# Patient Record
Sex: FEMALE | Race: WHITE | NOT HISPANIC OR LATINO | Employment: FULL TIME | URBAN - METROPOLITAN AREA
[De-identification: names, ages, dates, MRNs, and addresses within clinical notes are randomized per-mention and may not be internally consistent; named-entity substitution may affect disease eponyms.]

---

## 2017-09-05 ENCOUNTER — TELEPHONE (OUTPATIENT)
Dept: URGENT CARE | Facility: CLINIC | Age: 47
End: 2017-09-05

## 2017-09-07 ENCOUNTER — EH NON-PROVIDER (OUTPATIENT)
Dept: OCCUPATIONAL MEDICINE | Facility: CLINIC | Age: 47
End: 2017-09-07

## 2017-09-07 DIAGNOSIS — Z29.89 NEED FOR ISOLATION: ICD-10-CM

## 2017-09-07 PROCEDURE — 94375 RESPIRATORY FLOW VOLUME LOOP: CPT

## 2017-09-18 ENCOUNTER — HOSPITAL ENCOUNTER (OUTPATIENT)
Dept: LAB | Facility: MEDICAL CENTER | Age: 47
End: 2017-09-18
Payer: COMMERCIAL

## 2017-09-18 LAB
BDY FAT % MEASURED: 36.2 %
BP DIAS: 73 MMHG
BP SYS: 117 MMHG
CHOLEST SERPL-MCNC: 153 MG/DL (ref 100–199)
DIABETES HTDIA: NO
EVENT NAME HTEVT: NORMAL
FASTING HTFAS: YES
GLUCOSE SERPL-MCNC: 93 MG/DL (ref 65–99)
HDLC SERPL-MCNC: 37 MG/DL
HYPERTENSION HTHYP: NO
LDLC SERPL CALC-MCNC: 100 MG/DL
SCREENING LOC CITY HTCIT: NORMAL
SCREENING LOC STATE HTSTA: NORMAL
SCREENING LOCATION HTLOC: NORMAL
SMOKING HTSMO: NO
SUBSCRIBER ID HTSID: NORMAL
TRIGL SERPL-MCNC: 80 MG/DL (ref 0–149)

## 2017-09-18 PROCEDURE — 36415 COLL VENOUS BLD VENIPUNCTURE: CPT

## 2017-09-18 PROCEDURE — S5190 WELLNESS ASSESSMENT BY NONPH: HCPCS

## 2017-09-18 PROCEDURE — 80061 LIPID PANEL: CPT

## 2017-09-18 PROCEDURE — 82947 ASSAY GLUCOSE BLOOD QUANT: CPT

## 2017-10-20 ENCOUNTER — HOSPITAL ENCOUNTER (OUTPATIENT)
Dept: RADIOLOGY | Facility: MEDICAL CENTER | Age: 47
End: 2017-10-20
Attending: OBSTETRICS & GYNECOLOGY
Payer: COMMERCIAL

## 2017-10-20 DIAGNOSIS — Z12.31 VISIT FOR SCREENING MAMMOGRAM: ICD-10-CM

## 2017-10-20 PROCEDURE — G0202 SCR MAMMO BI INCL CAD: HCPCS

## 2017-11-08 ENCOUNTER — IMMUNIZATION (OUTPATIENT)
Dept: OCCUPATIONAL MEDICINE | Facility: CLINIC | Age: 47
End: 2017-11-08

## 2017-11-08 DIAGNOSIS — Z23 NEED FOR VACCINATION: ICD-10-CM

## 2017-11-08 PROCEDURE — 90686 IIV4 VACC NO PRSV 0.5 ML IM: CPT | Performed by: PREVENTIVE MEDICINE

## 2018-08-27 ENCOUNTER — DOCUMENTATION (OUTPATIENT)
Dept: OCCUPATIONAL MEDICINE | Facility: CLINIC | Age: 48
End: 2018-08-27

## 2018-09-13 ENCOUNTER — HOSPITAL ENCOUNTER (OUTPATIENT)
Dept: LAB | Facility: MEDICAL CENTER | Age: 48
End: 2018-09-13
Payer: COMMERCIAL

## 2018-09-13 LAB
BDY FAT % MEASURED: 36.7 %
BP DIAS: 64 MMHG
BP SYS: 115 MMHG
CHOLEST SERPL-MCNC: 153 MG/DL (ref 100–199)
DIABETES HTDIA: NO
EVENT NAME HTEVT: NORMAL
FASTING HTFAS: YES
GLUCOSE SERPL-MCNC: 90 MG/DL (ref 65–99)
HDLC SERPL-MCNC: 36 MG/DL
HYPERTENSION HTHYP: NO
LDLC SERPL CALC-MCNC: 97 MG/DL
SCREENING LOC CITY HTCIT: NORMAL
SCREENING LOC STATE HTSTA: NORMAL
SCREENING LOCATION HTLOC: NORMAL
SMOKING HTSMO: NO
SUBSCRIBER ID HTSID: NORMAL
TRIGL SERPL-MCNC: 102 MG/DL (ref 0–149)

## 2018-09-13 PROCEDURE — 82947 ASSAY GLUCOSE BLOOD QUANT: CPT

## 2018-09-13 PROCEDURE — S5190 WELLNESS ASSESSMENT BY NONPH: HCPCS

## 2018-09-13 PROCEDURE — 36415 COLL VENOUS BLD VENIPUNCTURE: CPT

## 2018-09-13 PROCEDURE — 80061 LIPID PANEL: CPT

## 2018-09-14 ENCOUNTER — EH NON-PROVIDER (OUTPATIENT)
Dept: OCCUPATIONAL MEDICINE | Facility: CLINIC | Age: 48
End: 2018-09-14

## 2018-09-14 DIAGNOSIS — Z02.89 ENCOUNTER FOR OCCUPATIONAL HEALTH EXAMINATION INVOLVING RESPIRATOR: Primary | ICD-10-CM

## 2018-09-14 PROCEDURE — 94375 RESPIRATORY FLOW VOLUME LOOP: CPT | Performed by: PREVENTIVE MEDICINE

## 2018-09-26 ENCOUNTER — IMMUNIZATION (OUTPATIENT)
Dept: OCCUPATIONAL MEDICINE | Facility: CLINIC | Age: 48
End: 2018-09-26

## 2018-09-26 DIAGNOSIS — Z23 NEED FOR VACCINATION: ICD-10-CM

## 2018-09-26 PROCEDURE — 90686 IIV4 VACC NO PRSV 0.5 ML IM: CPT | Performed by: PREVENTIVE MEDICINE

## 2018-10-24 ENCOUNTER — HOSPITAL ENCOUNTER (OUTPATIENT)
Dept: RADIOLOGY | Facility: MEDICAL CENTER | Age: 48
End: 2018-10-24
Attending: OBSTETRICS & GYNECOLOGY
Payer: COMMERCIAL

## 2018-10-24 DIAGNOSIS — Z12.31 VISIT FOR SCREENING MAMMOGRAM: ICD-10-CM

## 2018-10-24 PROCEDURE — 77067 SCR MAMMO BI INCL CAD: CPT

## 2019-09-18 ENCOUNTER — HOSPITAL ENCOUNTER (OUTPATIENT)
Dept: LAB | Facility: MEDICAL CENTER | Age: 49
End: 2019-09-18
Payer: COMMERCIAL

## 2019-09-18 LAB
BDY FAT % MEASURED: 37 %
BP DIAS: 77 MMHG
BP SYS: 119 MMHG
CHOLEST SERPL-MCNC: 157 MG/DL (ref 100–199)
DIABETES HTDIA: NO
EVENT NAME HTEVT: NORMAL
FASTING HTFAS: YES
GLUCOSE SERPL-MCNC: 92 MG/DL (ref 65–99)
HDLC SERPL-MCNC: 37 MG/DL
HYPERTENSION HTHYP: NO
LDLC SERPL CALC-MCNC: 103 MG/DL
SCREENING LOC CITY HTCIT: NORMAL
SCREENING LOC STATE HTSTA: NORMAL
SCREENING LOCATION HTLOC: NORMAL
SMOKING HTSMO: NO
SUBSCRIBER ID HTSID: NORMAL
TRIGL SERPL-MCNC: 87 MG/DL (ref 0–149)

## 2019-09-18 PROCEDURE — S5190 WELLNESS ASSESSMENT BY NONPH: HCPCS

## 2019-09-18 PROCEDURE — 82947 ASSAY GLUCOSE BLOOD QUANT: CPT

## 2019-09-18 PROCEDURE — 36415 COLL VENOUS BLD VENIPUNCTURE: CPT

## 2019-09-18 PROCEDURE — 80061 LIPID PANEL: CPT

## 2019-09-24 ENCOUNTER — IMMUNIZATION (OUTPATIENT)
Dept: OCCUPATIONAL MEDICINE | Facility: CLINIC | Age: 49
End: 2019-09-24

## 2019-09-24 DIAGNOSIS — Z23 NEED FOR VACCINATION: ICD-10-CM

## 2019-09-24 DIAGNOSIS — Z23 IMMUNIZATION DUE: ICD-10-CM

## 2019-09-24 PROCEDURE — 90686 IIV4 VACC NO PRSV 0.5 ML IM: CPT | Performed by: PREVENTIVE MEDICINE

## 2019-09-26 ENCOUNTER — DOCUMENTATION (OUTPATIENT)
Dept: OCCUPATIONAL MEDICINE | Facility: CLINIC | Age: 49
End: 2019-09-26

## 2019-10-01 ENCOUNTER — EH NON-PROVIDER (OUTPATIENT)
Dept: OCCUPATIONAL MEDICINE | Facility: CLINIC | Age: 49
End: 2019-10-01

## 2019-10-01 DIAGNOSIS — Z02.89 ENCOUNTER FOR OCCUPATIONAL HEALTH EXAMINATION INVOLVING RESPIRATOR: Primary | ICD-10-CM

## 2019-10-01 PROCEDURE — 94375 RESPIRATORY FLOW VOLUME LOOP: CPT | Performed by: PREVENTIVE MEDICINE

## 2019-10-31 ENCOUNTER — HOSPITAL ENCOUNTER (OUTPATIENT)
Dept: RADIOLOGY | Facility: MEDICAL CENTER | Age: 49
End: 2019-10-31
Attending: INTERNAL MEDICINE
Payer: COMMERCIAL

## 2019-10-31 DIAGNOSIS — Z12.31 SCREENING MAMMOGRAM, ENCOUNTER FOR: ICD-10-CM

## 2019-10-31 PROCEDURE — 77063 BREAST TOMOSYNTHESIS BI: CPT

## 2020-09-21 ENCOUNTER — IMMUNIZATION (OUTPATIENT)
Dept: OCCUPATIONAL MEDICINE | Facility: CLINIC | Age: 50
End: 2020-09-21

## 2020-09-21 DIAGNOSIS — Z23 NEED FOR VACCINATION: ICD-10-CM

## 2020-09-21 PROCEDURE — 90686 IIV4 VACC NO PRSV 0.5 ML IM: CPT | Performed by: PREVENTIVE MEDICINE

## 2020-10-18 PROCEDURE — 90656 IIV3 VACC NO PRSV 0.5 ML IM: CPT | Performed by: PREVENTIVE MEDICINE

## 2020-11-02 ENCOUNTER — HOSPITAL ENCOUNTER (OUTPATIENT)
Dept: RADIOLOGY | Facility: MEDICAL CENTER | Age: 50
End: 2020-11-02
Attending: INTERNAL MEDICINE
Payer: COMMERCIAL

## 2020-11-02 DIAGNOSIS — Z12.31 VISIT FOR SCREENING MAMMOGRAM: ICD-10-CM

## 2020-11-02 PROCEDURE — 77067 SCR MAMMO BI INCL CAD: CPT

## 2020-12-16 DIAGNOSIS — Z23 NEED FOR VACCINATION: ICD-10-CM

## 2020-12-18 ENCOUNTER — IMMUNIZATION (OUTPATIENT)
Dept: FAMILY PLANNING/WOMEN'S HEALTH CLINIC | Facility: IMMUNIZATION CENTER | Age: 50
End: 2020-12-18
Attending: FAMILY MEDICINE

## 2020-12-18 DIAGNOSIS — Z23 NEED FOR VACCINATION: ICD-10-CM

## 2020-12-18 DIAGNOSIS — Z23 ENCOUNTER FOR VACCINATION: Primary | ICD-10-CM

## 2020-12-18 PROCEDURE — 0001A PFIZER SARS-COV-2 VACCINE: CPT

## 2020-12-18 PROCEDURE — 91300 PFIZER SARS-COV-2 VACCINE: CPT

## 2021-01-08 ENCOUNTER — IMMUNIZATION (OUTPATIENT)
Dept: FAMILY PLANNING/WOMEN'S HEALTH CLINIC | Facility: IMMUNIZATION CENTER | Age: 51
End: 2021-01-08
Attending: FAMILY MEDICINE
Payer: COMMERCIAL

## 2021-01-08 DIAGNOSIS — Z23 ENCOUNTER FOR VACCINATION: Primary | ICD-10-CM

## 2021-01-08 PROCEDURE — 0002A PFIZER SARS-COV-2 VACCINE: CPT | Performed by: FAMILY MEDICINE

## 2021-01-08 PROCEDURE — 91300 PFIZER SARS-COV-2 VACCINE: CPT | Performed by: FAMILY MEDICINE

## 2021-03-24 ENCOUNTER — EH NON-PROVIDER (OUTPATIENT)
Dept: OCCUPATIONAL MEDICINE | Facility: CLINIC | Age: 51
End: 2021-03-24

## 2021-03-24 DIAGNOSIS — Z01.89 RESPIRATORY CLEARANCE EXAMINATION, ENCOUNTER FOR: ICD-10-CM

## 2021-03-24 PROCEDURE — 94375 RESPIRATORY FLOW VOLUME LOOP: CPT | Performed by: PREVENTIVE MEDICINE

## 2021-07-19 ENCOUNTER — TELEMEDICINE (OUTPATIENT)
Dept: TELEHEALTH | Facility: TELEMEDICINE | Age: 51
End: 2021-07-19
Payer: COMMERCIAL

## 2021-07-19 DIAGNOSIS — M54.50 ACUTE LEFT-SIDED LOW BACK PAIN WITHOUT SCIATICA: ICD-10-CM

## 2021-07-19 PROCEDURE — 99203 OFFICE O/P NEW LOW 30 MIN: CPT | Mod: 95,CR | Performed by: NURSE PRACTITIONER

## 2021-07-19 RX ORDER — METAXALONE 400 MG/1
800 TABLET ORAL 3 TIMES DAILY
Qty: 15 TABLET | Refills: 0 | Status: SHIPPED | OUTPATIENT
Start: 2021-07-19 | End: 2021-07-24

## 2021-07-19 ASSESSMENT — ENCOUNTER SYMPTOMS
WEAKNESS: 0
CHILLS: 0
FEVER: 0
BACK PAIN: 1
SENSORY CHANGE: 0
FALLS: 0
TINGLING: 0

## 2021-07-19 NOTE — PROGRESS NOTES
"Subjective:      Diana Hurtado is a 50 y.o. female who presents with No chief complaint on file.            Diana presents for a virtual visit in [place].  Identification was verified.  Patient was informed that encounter would be conducted over [employer], a secure, encrypted network and consent was obtained.  She reports a 5 day history of left sided low back pain around the SI and buttocks region.  She denies any known trauma or strain.  She denies any rash, bruising, swelling, or redness of the affected area.  She denies any history of similar symptoms, ankylosing spondylitis, or fibromyalgia.  She works as a physical therapist.  She has tried gentle stretches, biofreeze, Kinesthetic tape, and OTC analgesics with minimal relief.  She reports that rising from being supine and also prolonged sitting worsen her symptoms.  At worse, the pain was a 9/10.  Presently it is a 6/10 with no radicular pain, numbness, tingling or weakness, saddle anesthesia or incontinence.  She would like to try a referral to physical therapy.      Review of Systems   Constitutional: Negative for chills, fever and malaise/fatigue.   Cardiovascular: Negative for leg swelling.   Musculoskeletal: Positive for back pain. Negative for falls.   Neurological: Negative for tingling, sensory change and weakness.     Medications, Allergies, and current problem list reviewed today in Epic     Objective:     There were no vitals taken for this visit.     Physical Exam  Constitutional:       General: She is not in acute distress.     Appearance: Normal appearance. She is not ill-appearing, toxic-appearing or diaphoretic.   Musculoskeletal:      Lumbar back: Spasms and tenderness present. No swelling, edema, deformity, signs of trauma, lacerations or bony tenderness. Normal range of motion.        Back:       Comments: Diana reports generalized TTP of the low back at the left SI and buttocks regions.  She notes a palpable \"knot\" of muscle tightness on " palpation.  Denies any decreased ROM.  Leg sensation and strength grossly intact.    Neurological:      Mental Status: She is alert and oriented to person, place, and time.      Motor: No weakness.   Psychiatric:         Mood and Affect: Mood normal.                        Assessment/Plan:        1. Acute left-sided low back pain without sciatica  - REFERRAL TO PHYSICAL THERAPY  - metaxalone (SKELAXIN) 400 MG Tab; Take 2 Tablets by mouth 3 times a day for 5 days.  Dispense: 15 tablet; Refill: 0    Discussed exam findings with Diana.  Differential reviewed.  OTC analgesics prn pain.  May also trial skelaxin as prescribed; sedation precautions discussed.  Physical therapy as referred.    Gentle stretching, warm compress, massage prn pain.  Follow up in 2 weeks if symptoms persist, sooner if worse.    Addendum 7/20/21 at 11:28 AM  Message from Diana requesting steroid.  Will prescribe prednisone 20 mg Bid x 5 days.  Tylenol and/or skelaxin prn pain.  Follow up as previously advised.  1. Acute left-sided low back pain without sciatica  REFERRAL TO PHYSICAL THERAPY    metaxalone (SKELAXIN) 400 MG Tab    predniSONE (DELTASONE) 20 MG Tab       She verbalized understanding of and agreed with plan of care.

## 2021-07-20 ENCOUNTER — PHYSICAL THERAPY (OUTPATIENT)
Dept: PHYSICAL THERAPY | Facility: REHABILITATION | Age: 51
End: 2021-07-20
Attending: NURSE PRACTITIONER
Payer: COMMERCIAL

## 2021-07-20 DIAGNOSIS — M54.50 ACUTE LEFT-SIDED LOW BACK PAIN WITHOUT SCIATICA: ICD-10-CM

## 2021-07-20 PROCEDURE — 97110 THERAPEUTIC EXERCISES: CPT

## 2021-07-20 PROCEDURE — 97140 MANUAL THERAPY 1/> REGIONS: CPT

## 2021-07-20 PROCEDURE — 97014 ELECTRIC STIMULATION THERAPY: CPT

## 2021-07-20 PROCEDURE — 97161 PT EVAL LOW COMPLEX 20 MIN: CPT

## 2021-07-20 RX ORDER — PREDNISONE 20 MG/1
20 TABLET ORAL 2 TIMES DAILY
Qty: 10 TABLET | Refills: 0 | Status: SHIPPED | OUTPATIENT
Start: 2021-07-20 | End: 2021-07-25

## 2021-07-20 ASSESSMENT — ENCOUNTER SYMPTOMS
PAIN SCALE AT HIGHEST: 8
QUALITY: ACHING
PAIN SCALE AT LOWEST: 5
PAIN SCALE: 6

## 2021-07-20 NOTE — OP THERAPY EVALUATION
"  Outpatient Physical Therapy  INITIAL EVALUATION    [facility] Physical [address].  [address]  [place] [address]  Phone:  [phone]  Fax:  [phone]    Date of Evaluation: 2021    Patient: Diana Huratdo  YOB: 1970  MRN: [id number]     Referring Provider: CONSTANCE Block  [id number]  [address]  [place] [address]   Referring Diagnosis Acute left-sided low back pain without sciatica [M54.5]     Time Calculation  Start time: 931  Stop time: 9 Time Calculation (min): 58 minutes         Chief Complaint: Back Problem    Visit Diagnoses     ICD-10-CM   1. Acute left-sided low back pain without sciatica  M54.5       Date of onset of impairment: No data found    Subjective:   History of Present Illness:     Mechanism of injury:  Patient is a 50 year old female with a PMH including: TMJ implant removal; dyslipidemia.    Pt presents to therapy with complaints of 5 day history of left sided low back pain and \"odd sensation\" around the SI and buttocks region with insidious onset. She has tried gentle stretches, biofreeze, Kinesthetic tape, and OTC analgesics with minimal relief. Pt was seen by friend who is also a therapist; performed light manual joint mobs, needling, lateral Latasha exercises with some centralization. Per MD note, rising from being supine and also prolonged sitting worsen her symptoms. Pt provided with gentle muscle relaxer from primary care. Endorses increase in s/s with valsalva and pain with urination and BM's with straining. Otherwise denies changes in bowel and bladder, saddle anesthesia, significant weight changes, chills/night sweats, nausea and vomiting, and unexplained fatigue. Pt consents to evaluation and treatment today.         Sleep disturbance:  Interrupted sleep (Most comfortable in R SL)  Pain:     Current pain ratin    At best pain ratin    At worst pain ratin    Location:  L SI region and L buttock " "not surpassing gluteal fold    Quality:  Aching (\"odd sensation\")    Pain timing: AM.    Pain Comments::  Aggravating: Sit>stand; rolling in bed, getting out of bed; prolonged sitting>immediately, donning socks/shoes    Relieving: ice, lying on R to sleep  Diagnostic Tests:     Diagnostic Tests Comments:  No lumbar imaging*    Cervical spine x ray 10/12/04:  FINDINGS:     The cervical spine is intact and normally aligned, with   normal vertebral body heights and maintained disk spaces.  The   prevertebral soft tissues are normal.      Impression  IMPRESSION:     1. NORMAL RADIOGRAPHS OF THE CERVICAL SPINE.    Thoracic spine x ray 10/12/04:    FINDINGS:     There is mild dextroconvex mid to inferior thoracic   curvature.  Multilevel anterior endplate osteophytosis is present, with   preserved disk spaces.  The thoracic spine is normally aligned, with   normal vertebral body heights. No evidence of paraspinous soft tissue   abnormality.      Impression  IMPRESSION:     1. MULTILEVEL MILD DEGENERATIVE DISK DISEASE AND MILD DEXTROCONVEX   INFERIOR THORACIC SCOLIOSIS.    Treatments:     Treatment Comments:  Pt provided with gentle muscle relaxer  Activities of Daily Living:     Patient reported ADL status: Patient's current daily routine includes:  Work: Physical therapist-Acute Rehab Manager  Exercise: No current exercise routine in place    ADL's:  Family assisting with bed mobility and dressing per above. Difficulty with sleep hygiene.    Patient Goals:     Patient goals for therapy:  Decreased pain and independence with ADLs/IADLs      Past Medical History:   Diagnosis Date   • Dyslipidemia 5/9/2013     Past Surgical History:   Procedure Laterality Date   • TMJ IMPLANT REMOVAL       Social History     Tobacco Use   • Smoking status: Never Smoker   • Smokeless tobacco: Never Used   Substance Use Topics   • Alcohol use: No     Family and Occupational History     Socioeconomic History   • Marital status:      " Spouse name: Not on file   • Number of children: Not on file   • Years of education: Not on file   • Highest education level: Not on file   Occupational History   • Not on file       Objective     Postural Observations    Additional Postural Observation Details  Pt uncomfortable during session requiring standing breaks   Significant difficulty with increased time required for bed mobility; visible distress to point of tears with transfer from prone>SL>sitting EOB    Neurological Testing     Dermatome testing   Lumbar (left)   All left lumbar dermatomes intact    Lumbar (right)   All right lumbar dermatomes intact    Additional Neurological Details  Dermatomes assessed standing for comfort of patient    Palpation   Left   Hypertonic in the lumbar paraspinals.   Tenderness of the lumbar paraspinals.     Tenderness     Left Hip   Tenderness in the sacroiliac joint.      Active Range of Motion     Lumbar   Flexion: decreased  Extension: decreased  Left lateral flexion: decreased  Right lateral flexion: decreased  Left rotation: decreased  Right rotation: decreased    Additional Active Range of Motion Details  Significant loss of AROM in all directions  Sagittal plane limited with minimal ROM resultant in increase pain complaints extending to L glute  Side gliding limited B R>L with pain towards R    Joint Play   Spine     Central PA [place]        L3: hypomobile       L4: hypomobile and painful       L5: hypomobile and painful       S1: hypomobile and painful    Unilateral PA Glide (left)        L4: hypomobile and painful       L5: hypomobile and painful       S1: hypomobile and painful    Unilateral PA Glide (right)        L4: hypomobile       L5: hypomobile       S1: hypomobile        General Comments     Spine Comments   Prone lying: Centralizes to Lumbar from L glute; no change with return to standing/no better            Therapeutic Exercises (CPT 18402):     1. Pt education, re: discussing with PCP re: alternative  "pain management options (i.e. steroid pack) if poor response to muscle relaxer    2. Pt education, re: sleeping in hooklying with LE's on bolster or positioning in R SL with L LE draped and rotated for position of comfort    3. Pt education, re: management in s/s in order to tolerate therapy    4. Discussed incorporation of gentle nerve glide but did not proceed due to acute severe pain complaints    Therapeutic Treatments and Modalities:     1. E Stim Unattended (CPT 60128), IFC and ice to l/s x 15 min with pt positioned in R SL     2. Manual Therapy (CPT 76550), See below    Therapeutic Treatment and Modalities Summary: Manual:   Gentle CPA gr II/III to L3-S1 followed by UPA to R L3-S1//initially centralizing with UPA, however, pain returning in L lumbar  *Pt has attempted L side glides with alternate PT, reports no change in pain with this hep    Gentle manual traction with belt (pt in hooklying): NE, NE    Time-based treatments/modalities:    Physical Therapy Timed Treatment Charges  Manual therapy minutes (CPT 93712): 7 minutes  Therapeutic exercise minutes (CPT 27330): 16 minutes      Assessment, Response and Plan:   Impairments: abnormal ADL function, abnormal muscle tone, abnormal or restricted ROM, activity intolerance, difficulty performing job, impaired functional mobility, hypersensitivity, lacks appropriate home exercise program, limited ADL's, limited mobility and pain with function    Assessment details:  Patient is a pleasant and cooperative 50 year old female who presents to therapy with 5 day acute L LBP with referral to L glute. Pt reporting difficulty with tolerating ADL's such as sleeping, bed mobility, dressing (family assisting) secondary to pain; additionally endorsing \"odd sensation\" in similar distribution. Pt is currently working as an Acute Rehab manager.    Limited exam today due to high pain behaviors, including significant pain with bed mobility and transfers during evaluation. Due to " acuity and severity of pain difficult to classify patient as a responder to Latasha treatments; does centralize to some extent in prone but with pain increasing with prolonged positioning, other therapist attempting side glides with mod tolerance but no overall improvement per pt report. Emphasis on session today discussing pain management and pt education as above. Once pain more appropriately managed, will conduct further testing as needed for additional impairments. Pt may benefit from skilled physical therapy in order to address above impairments in order to improve QOL and return to reported ADL's.     Barriers to therapy:  Poorly tolerated treatments  Prognosis: fair    Goals:   Short Term Goals:   1. Pt will be independent with written HEP.  2. Pt will be able to tolerate physical therapy sessions with adequate pain management.  Short term goal time span:  2-4 weeks      Long Term Goals:    1. Pt will be independent with written HEP.  2. Pt will have a sig improvement in RMQ score >/= YANET/MCID (eval:70.83)  3. Pt will report at least 50% improvement in sleep hygiene.  4. Pt will don/doff socks/shoes indep at least 70% of the time without increase in baseline s/s.  5. Pt will be able to tolerate work day without increase in baseline s/s at least 70% of the time or greater in order to improve QOL.    Long term goal time span:  6-8 weeks    Plan:   Therapy options:  Physical therapy treatment to continue  Planned therapy interventions:  Neuromuscular Re-education (CPT 28558), Manual Therapy (CPT 82579), E Stim Unattended (CPT 94554), Mechanical Traction (CPT 68296), Therapeutic Exercise (CPT 11001), Therapeutic Activities (CPT 30698) and Gait Training (CPT 00541)  Frequency:  2x week  Discussed with:  Patient  Plan details:  UPOC: 9/17/21    *Pt may progress to 1x/wk as she progresses with hep      Functional Assessment Used  Hood Horace Low Back Pain and Disability Score: 70.83     Referring provider  co-signature:  I have reviewed this plan of care and my co-signature certifies the need for services.    Certification Period: 07/20/2021 to  9/17/21    Physician Signature: ________________________________ Date: ______________

## 2021-07-21 ENCOUNTER — PHYSICAL THERAPY (OUTPATIENT)
Dept: PHYSICAL THERAPY | Facility: REHABILITATION | Age: 51
End: 2021-07-21
Attending: NURSE PRACTITIONER
Payer: COMMERCIAL

## 2021-07-21 DIAGNOSIS — M54.50 ACUTE LEFT-SIDED LOW BACK PAIN WITHOUT SCIATICA: ICD-10-CM

## 2021-07-21 PROCEDURE — 97110 THERAPEUTIC EXERCISES: CPT

## 2021-07-21 PROCEDURE — 97140 MANUAL THERAPY 1/> REGIONS: CPT

## 2021-07-21 PROCEDURE — 97014 ELECTRIC STIMULATION THERAPY: CPT

## 2021-07-21 NOTE — OP THERAPY DAILY TREATMENT
Outpatient Physical Therapy  DAILY TREATMENT     [facility] Physical Therapy [address].  [address]  [place] [address]  Phone:  [phone]  Fax:  [phone]    Date: 07/21/2021    Patient: Diana Hurtado  YOB: 1970  MRN: [id number]     Time Calculation    Start time: 1015  Stop time: 1115 Time Calculation (min): 60 minutes         Chief Complaint: No chief complaint on file.    Visit #: 2    SUBJECTIVE:  Patient reports that she is still really hurting although she has had a little relief with a Medrol dose pack    OBJECTIVE:            Therapeutic Treatments and Modalities:     Therapeutic Treatment and Modalities Summary: seil with pillow under stomach and R hip flexion  P/a l4-5 with R asis moment arm  P/a R TP l4-5 gd 3-4  Supine fle/rotate to R gd 3-4  sacral float// no buttock  DN: Patient  verbally agreed with informed consent to procedure of dry needling   skin prep with isopropyl  Alcohol  -bilateral L/S multifidis bilaterally l4-s1  -TENS w/ FDN w/ varying frequencies  -MHP x 10'  -No adverse reactions observed post treatment  Patient reported no gluteal pain  MEME--reviewed HEP  Tape  L/s posture cues      Time-based treatments/modalities:    Physical Therapy Timed Treatment Charges  Manual therapy minutes (CPT 83576): 30 minutes  Therapeutic exercise minutes (CPT 69156): 10 minutes      Pain rating (1-10) before treatment:  6/10 back R gluts  Pain rating (1-10) after treatment:  3--middle back only    ASSESSMENT:   Patient centralized with manual and dennis treatment focus.  She continues to present with significant irritability    PLAN/RECOMMENDATIONS:   Progress dennis protocol, core stab,  manual treatment, traction and DN as indicated

## 2021-07-23 ENCOUNTER — PHYSICAL THERAPY (OUTPATIENT)
Dept: PHYSICAL THERAPY | Facility: REHABILITATION | Age: 51
End: 2021-07-23
Attending: NURSE PRACTITIONER
Payer: COMMERCIAL

## 2021-07-23 DIAGNOSIS — M54.50 ACUTE LEFT-SIDED LOW BACK PAIN WITHOUT SCIATICA: ICD-10-CM

## 2021-07-23 PROCEDURE — 97140 MANUAL THERAPY 1/> REGIONS: CPT

## 2021-07-23 PROCEDURE — 97014 ELECTRIC STIMULATION THERAPY: CPT

## 2021-07-23 PROCEDURE — 97110 THERAPEUTIC EXERCISES: CPT

## 2021-07-24 NOTE — OP THERAPY DAILY TREATMENT
Outpatient Physical Therapy  DAILY TREATMENT     [facility] Therapy [address].  [address]  [place] [address]  Phone:  [phone]  Fax:  [phone]    Date: 07/23/2021    Patient: Diana Hurtado  YOB: 1970  MRN: [id number]     Time Calculation    Start time: 0415  Stop time: 0519 Time Calculation (min): 64 minutes         Chief Complaint: No chief complaint on file.    Visit #: 3    SUBJECTIVE:  Much better but still having L sacral pain    OBJECTIVE:  L asis High          Therapeutic Treatments and Modalities:     Therapeutic Treatment and Modalities Summary: Reil with and without pt.and PT o/p  P/a L% gd 4  MET L asis high  sacral float// no buttock  Pelvic stab on sacral foam wedge--hep  DN:  Patient  verbally agreed with informed consent to procedure of dry needling   skin prep with isopropyl  Alcohol  -bilateral L/S multifidis bilaterally l4-s3 w/ SEIL about 30 deg  -TENS w/ FDN w/ varying frequencies  -MHP x 10'  -No adverse reactions observed post treatment  Patient reported no gluteal pain  MEME--reviewed HEP  Tape  L/s posture cues      Time-based treatments/modalities:    Physical Therapy Timed Treatment Charges  Manual therapy minutes (CPT 36012): 15 minutes  Therapeutic exercise minutes (CPT 00092): 15 minutes      Pain rating (1-10) before treatment:  4/10 back R gluts  Pain rating (1-10) after treatment:  1-2--middle back only    ASSESSMENT:   Patient continued  To centralized with manual and dennis treatment focus.  Noted L high asis     PLAN/RECOMMENDATIONS:   Progress dennis protocol, core stab,  manual treatment, traction and DN as indicated

## 2021-07-28 ENCOUNTER — PHYSICAL THERAPY (OUTPATIENT)
Dept: PHYSICAL THERAPY | Facility: REHABILITATION | Age: 51
End: 2021-07-28
Attending: NURSE PRACTITIONER
Payer: COMMERCIAL

## 2021-07-28 DIAGNOSIS — M54.50 ACUTE LEFT-SIDED LOW BACK PAIN WITHOUT SCIATICA: ICD-10-CM

## 2021-07-28 PROCEDURE — 97014 ELECTRIC STIMULATION THERAPY: CPT

## 2021-07-28 PROCEDURE — 97012 MECHANICAL TRACTION THERAPY: CPT

## 2021-07-28 PROCEDURE — 97140 MANUAL THERAPY 1/> REGIONS: CPT

## 2021-07-28 NOTE — OP THERAPY DAILY TREATMENT
"  Outpatient Physical Therapy  DAILY TREATMENT     [facility] Physical [address].  [address]  [place] [address]  Phone:  [phone]  Fax:  [phone]    Date: 07/28/2021    Patient: Diana Hurtado  YOB: 1970  MRN: [id number]     Time Calculation    Start time: 0245  Stop time: 0400 Time Calculation (min): 75 minutes         Chief Complaint: No chief complaint on file.    Visit #: 4    SUBJECTIVE:  Better for a day w/ sx returning but no as bad and as far down leg    OBJECTIVE:  L asis High          Therapeutic Treatments and Modalities:     Therapeutic Treatment and Modalities Summary: self MET--Break stick\"\  MET for L sacral rotation in side-lying  P/a s/p and L: TP gd 24  l5 -s1 rot--w/ asis      DN:  Patient  verbally agreed with informed consent to procedure of dry needling   skin prep with isopropyl  Alcohol  -bilateral L/S multifidis bilaterally l4-s3 w/ cupping  -TENS w/ FDN //// slight nausea and lightheadedness afterwards--supine trendelnberg psostion for 5' w/ a few sips pf cold water--patient reported that she felt better and less nauseous  -MHP x 10'  -No adverse reactions observed post treatment    Mechanical traction  80/500 x 60/20 x 15' w/ mhp// cont. Slight nausea and h/a but improving after treatment.  Patient stated that she was feeling less nauseous  w/ much less back and buttock pain      Time-based treatments/modalities:    Physical Therapy Timed Treatment Charges  Manual therapy minutes (CPT 96340): 30 minutes  Therapeutic exercise minutes (CPT 18268): 5 minutes      Pain rating (1-10) before treatment:  4/10 back L gluts  Pain rating (1-10) after treatment:  1-2--middle back only but slightly nauseous    ASSESSMENT:   Patient continued  To centralized with treatment but peripheralized with loading.  Noted pelvic and sacral obliquity with severe palpatory tenderness to L sacral base and paraspinal attachment.    PLAN/RECOMMENDATIONS: "   Progress dennis protocol, core stab,  manual treatment, traction and DN as indicated, assess pelvis

## 2021-07-30 ENCOUNTER — PHYSICAL THERAPY (OUTPATIENT)
Dept: PHYSICAL THERAPY | Facility: REHABILITATION | Age: 51
End: 2021-07-30
Attending: NURSE PRACTITIONER
Payer: COMMERCIAL

## 2021-07-30 DIAGNOSIS — M54.50 ACUTE LEFT-SIDED LOW BACK PAIN WITHOUT SCIATICA: ICD-10-CM

## 2021-07-30 PROCEDURE — 97014 ELECTRIC STIMULATION THERAPY: CPT

## 2021-07-30 PROCEDURE — 97140 MANUAL THERAPY 1/> REGIONS: CPT

## 2021-07-30 PROCEDURE — 97110 THERAPEUTIC EXERCISES: CPT

## 2021-07-30 NOTE — OP THERAPY DAILY TREATMENT
"  Outpatient Physical Therapy  DAILY TREATMENT     [facility] Physical Therapy [address].  [address]  [place] [address]  Phone:  [phone]  Fax:  [phone]    Date: 07/30/2021    Patient: Diana Hurtado  YOB: 1970  MRN: [id number]     Time Calculation    Start time: 0338  Stop time: 0430 Time Calculation (min): 52 minutes         Chief Complaint: No chief complaint on file.    Visit #: 5    SUBJECTIVE:  Much better with only slight R sided pain and much less pain with movement    OBJECTIVE:  L asis High          Therapeutic Treatments and Modalities:     Therapeutic Treatment and Modalities Summary: self MET--Break stick\"  Sacral float    P/a s/p and L: TPl4-5 gd 2-4 in SEIL   l5 -s1 rot--w/ asis    DN: Patient signed informed written release and verbally agreed with informed consent to procedure of dry needling   skin prep with isopropyl  Alcohol  -bilateral C/S multifidis L4-sij, illiac crest--pecking ILL insertion  -TENS w/ FDN w. Varying reginald    -MHP x 10'  -No adverse reactions observed post treatment    Mechanical traction  80/500 x 60/20 x 15' w/ mhp// cont. Slight nausea and h/a but improving after treatment.  Patient stated that she was feeling less nauseous  w/ much less back and buttock pain      Time-based treatments/modalities:    Physical Therapy Timed Treatment Charges  Manual therapy minutes (CPT 96622): 35 minutes  Therapeutic exercise minutes (CPT 30614): 5 minutes      Pain rating (1-10) before treatment:  2/10 back L gluts  Pain rating (1-10) after treatment:  1-2--middle back only but slightly nauseous    ASSESSMENT:   Patient continued  To centralized with treatment .  Noted pelvic and sacral obliquity with severe palpatory tenderness to L SIJ --significant improved mobility and sitting tolerance    PLAN/RECOMMENDATIONS:   Progress dennis protocol, core stab,  manual treatment, traction and DN as indicated, assess pelvis       "

## 2021-08-03 ENCOUNTER — PHYSICAL THERAPY (OUTPATIENT)
Dept: PHYSICAL THERAPY | Facility: REHABILITATION | Age: 51
End: 2021-08-03
Attending: NURSE PRACTITIONER
Payer: COMMERCIAL

## 2021-08-03 DIAGNOSIS — M54.50 ACUTE LEFT-SIDED LOW BACK PAIN WITHOUT SCIATICA: ICD-10-CM

## 2021-08-03 PROCEDURE — 97140 MANUAL THERAPY 1/> REGIONS: CPT

## 2021-08-03 PROCEDURE — 97012 MECHANICAL TRACTION THERAPY: CPT

## 2021-08-03 NOTE — OP THERAPY DAILY TREATMENT
"  Outpatient Physical Therapy  DAILY TREATMENT     [facility] Physical Therapy [address]  [address]  [place] [address]  Phone:  [phone]  Fax:  [phone]    Date: 08/03/2021    Patient: Diana Hurtado  YOB: 1970  MRN: [id number]     Time Calculation    Start time: 0805  Stop time: 0855 Time Calculation (min): 50 minutes         Chief Complaint: No chief complaint on file.    Visit #: 6    SUBJECTIVE:   minimal pain with standing but limits with sitting any lying position  \" good relief after last visit but not lasting\"  OBJECTIVE:  L asis High          Therapeutic Treatments and Modalities:     Therapeutic Treatment and Modalities Summary: MET asis  Sacral float  S/l ext/rot HVLA mobs  L/s--bilateral  S/l l5-s1 gd 3-4 mobs to L  Side with femur as force arm  Reil with and wihtout o/p--> belt/sheet o/p( instructed pt. In technique for home)          Mechanical traction --prone-- 90/50 x 60/20 x 15' w/ mhp    Time-based treatments/modalities:    Physical Therapy Timed Treatment Charges  Manual therapy minutes (CPT 11609): 30 minutes      Pain rating (1-10) before treatment:  2/10 back L gluts--SI  Pain rating (1-10) after treatment:  Better,-middle back   ASSESSMENT:   Patient continued  To centralized with treatment .  Noted cont.  pelvic and sacral obliquity with severe palpatory tenderness to L sacral base--repsponded better with dennis progression with o/p    PLAN/RECOMMENDATIONS:core stab,  manual treatment, traction and DN as indicated, assess pelvis       "

## 2021-08-05 ENCOUNTER — PHYSICAL THERAPY (OUTPATIENT)
Dept: PHYSICAL THERAPY | Facility: REHABILITATION | Age: 51
End: 2021-08-05
Attending: NURSE PRACTITIONER
Payer: COMMERCIAL

## 2021-08-05 DIAGNOSIS — M54.50 ACUTE LEFT-SIDED LOW BACK PAIN WITHOUT SCIATICA: ICD-10-CM

## 2021-08-05 PROCEDURE — 97110 THERAPEUTIC EXERCISES: CPT

## 2021-08-05 PROCEDURE — 97014 ELECTRIC STIMULATION THERAPY: CPT

## 2021-08-05 PROCEDURE — 97012 MECHANICAL TRACTION THERAPY: CPT

## 2021-08-05 PROCEDURE — 97140 MANUAL THERAPY 1/> REGIONS: CPT

## 2021-08-10 ENCOUNTER — PHYSICAL THERAPY (OUTPATIENT)
Dept: PHYSICAL THERAPY | Facility: REHABILITATION | Age: 51
End: 2021-08-10
Attending: NURSE PRACTITIONER
Payer: COMMERCIAL

## 2021-08-10 DIAGNOSIS — M54.50 ACUTE LEFT-SIDED LOW BACK PAIN WITHOUT SCIATICA: ICD-10-CM

## 2021-08-10 PROCEDURE — 97140 MANUAL THERAPY 1/> REGIONS: CPT

## 2021-08-10 PROCEDURE — 97110 THERAPEUTIC EXERCISES: CPT

## 2021-08-10 PROCEDURE — 97012 MECHANICAL TRACTION THERAPY: CPT

## 2021-08-10 PROCEDURE — 97014 ELECTRIC STIMULATION THERAPY: CPT

## 2021-08-10 NOTE — OP THERAPY DAILY TREATMENT
"  Outpatient Physical Therapy  DAILY TREATMENT     [facility] Physical Therapy [address].  [address]  [place] [address]  Phone:  [phone]  Fax:  [phone]    Date: 08/10/2021    Patient: Diana Hurtado  YOB: 1970  MRN: [id number]     Time Calculation    Start time: 0245  Stop time: 0345 Time Calculation (min): 60 minutes         Chief Complaint: No chief complaint on file.    Visit #: 8    SUBJECTIVE:   pretty good over the weekend but a little sore past 24 hrs at work--patient reproted 70% overall improvement since stat , most change past week  OBJECTIVE:   asis level          Therapeutic Treatments and Modalities:     Therapeutic Treatment and Modalities Summary:   ERICH with p/a mobs l4-5 gd 4    Ball bridge x 1 x2 // slight leg pain but resolved OOP--\" no pain\" in standing after ex  Bilateral ext/rot HVLA--patient expain benefits and risks and verbally agreed to treatment  DN: Patient signed informed written release and verbally agreed with informed consent to procedure of dry needling   skin prep with isopropyl  Alcohol/Chlora prep  -L asis and pecking to L ILL//reproduced leg pain with DN to ILL  -TENS w/ FDN varying freq  -MHP x 10'  -No adverse reactions observed post treatment  Mechanical traction --prone-- 90/50 x 60/20 x 15' w/ mhp    Taped l/s  instructed vertical towel in car  Tape l/s for BFB    Time-based treatments/modalities:    Physical Therapy Timed Treatment Charges  Manual therapy minutes (CPT 93409): 15 minutes  Therapeutic exercise minutes (CPT 47126): 20 minutes      Pain rating (1-10) before treatment:  2-3 /10 back L gluts--SI  Pain rating (1-10) after treatment:  Better, no pain  ASSESSMENT:   Cont. To centralize--reproduced leg sx with DN ti ILL    PLAN/RECOMMENDATIONS: core stab,  manual treatment, traction and DN as indicated, assess pelvis       "

## 2021-08-12 ENCOUNTER — PHYSICAL THERAPY (OUTPATIENT)
Dept: PHYSICAL THERAPY | Facility: REHABILITATION | Age: 51
End: 2021-08-12
Attending: NURSE PRACTITIONER
Payer: COMMERCIAL

## 2021-08-12 DIAGNOSIS — M54.50 ACUTE LEFT-SIDED LOW BACK PAIN WITHOUT SCIATICA: ICD-10-CM

## 2021-08-12 PROCEDURE — 97014 ELECTRIC STIMULATION THERAPY: CPT

## 2021-08-12 PROCEDURE — 97012 MECHANICAL TRACTION THERAPY: CPT

## 2021-08-12 PROCEDURE — 97110 THERAPEUTIC EXERCISES: CPT

## 2021-08-12 PROCEDURE — 97140 MANUAL THERAPY 1/> REGIONS: CPT

## 2021-08-12 NOTE — OP THERAPY DAILY TREATMENT
"  Outpatient Physical Therapy  DAILY TREATMENT     [facility] Physical Therapy [address]  [address]  [place] [address]  Phone:  [phone]  Fax:  [phone]    Date: 08/12/2021    Patient: Diana Hurtado  YOB: 1970  MRN: [id number]     Time Calculation    Start time: 0245  Stop time: 0345 Time Calculation (min): 60 minutes         Chief Complaint: No chief complaint on file.    Visit #: 9    SUBJECTIVE:  No pain for a day but started to come back today--still limited at night with inability to sleep  OBJECTIVE:   asis level          Therapeutic Treatments and Modalities:     Therapeutic Treatment and Modalities Summary: Superman x 2 x 50\"  P/a l4-5 gd 2-4// no pain     DN: Patient signed informed written release and verbally agreed with informed consent to procedure of dry needling   skin prep with isopropyl  Alcohol/Chlora prep  -L asis and pecking to L ILL//reproduced leg pain with DN to ILL  -TENS w/ FDN varying freq  -MHP x 10'  -No adverse reactions observed post treatment  Mechanical traction --prone-- 80/50 x 60/20 x 15' w/ mhp    Taped l/s      Time-based treatments/modalities:    Physical Therapy Timed Treatment Charges  Manual therapy minutes (CPT 94317): 20 minutes  Therapeutic exercise minutes (CPT 50953): 5 minutes      Pain rating (1-10) before treatment:  2 /10 back L gluts--SI  Pain rating (1-10) after treatment:  A little nauseous and slight pain but better  ASSESSMENT:   Cont. To centralize--reproduced leg sx with DN to ILL--centraaized with extension protocol and treatment and still sensitive to flexion and axial loaded postures and does not tolerate laying in any position for sleep    PLAN/RECOMMENDATIONS: core stab,  manual treatment, traction and DN as indicated, assess pelvis       "

## 2021-08-17 ENCOUNTER — PHYSICAL THERAPY (OUTPATIENT)
Dept: PHYSICAL THERAPY | Facility: REHABILITATION | Age: 51
End: 2021-08-17
Attending: NURSE PRACTITIONER
Payer: COMMERCIAL

## 2021-08-17 DIAGNOSIS — M54.50 ACUTE LEFT-SIDED LOW BACK PAIN WITHOUT SCIATICA: ICD-10-CM

## 2021-08-17 PROCEDURE — 97012 MECHANICAL TRACTION THERAPY: CPT

## 2021-08-17 PROCEDURE — 97110 THERAPEUTIC EXERCISES: CPT

## 2021-08-17 PROCEDURE — 97140 MANUAL THERAPY 1/> REGIONS: CPT

## 2021-08-17 NOTE — OP THERAPY DAILY TREATMENT
"  Outpatient Physical Therapy  DAILY TREATMENT     [facility] Physical Therapy [address].  [address]  [place] [address]  Phone:  [phone]  Fax:  [phone]    Date: 08/17/2021    Patient: Diana Hurtado  YOB: 1970  MRN: [id number]     Time Calculation    Start time: 1019  Stop time: 1110 Time Calculation (min): 51 minutes         Chief Complaint: No chief complaint on file.    Visit #: 10    SUBJECTIVE:  Better for a few days over the weekend but still struggles with static position--patient is still waking  OBJECTIVE:  Moderate TTP           Therapeutic Treatments and Modalities:     Therapeutic Treatment and Modalities Summary: STM bilateral multifidi  P/a l5 gd 4// a little better  Cupping with rocking  FF and trunk rotation  Superman x 1'// increased pain--REIL//better\"--> superman  x 30\" stopped prior to pain  MEME/L and agianst wall    -MHP x 10'  -No adverse reactions observed post treatment  Mechanical traction --prone-- 90/50 x 60/20 x 15' w/ mhp    Taped l/s      Time-based treatments/modalities:    Physical Therapy Timed Treatment Charges  Manual therapy minutes (CPT 45890): 18 minutes  Therapeutic exercise minutes (CPT 62912): 20 minutes      Pain rating (1-10) before treatment: 1 /10 back L gluts--SI  Pain rating (1-10) after treatment:  A little nauseous and slight pain but better    ASSESSMENT:   Noted ttp multifidi and sacral base--cont ttp since last rx of DR--patient reported abolition of pain after cupping--slight increased pain with supermans > 45\"{--cont. Noted fair-poor paraspinal  endurance  Reduced pain with repetitive movements  PLAN/RECOMMENDATIONS: core stab,  manual treatment, traction and DN as indicated, assess pelvis       "

## 2021-08-19 ENCOUNTER — PHYSICAL THERAPY (OUTPATIENT)
Dept: PHYSICAL THERAPY | Facility: REHABILITATION | Age: 51
End: 2021-08-19
Attending: NURSE PRACTITIONER
Payer: COMMERCIAL

## 2021-08-19 DIAGNOSIS — M54.50 ACUTE LEFT-SIDED LOW BACK PAIN WITHOUT SCIATICA: ICD-10-CM

## 2021-08-19 PROCEDURE — 97110 THERAPEUTIC EXERCISES: CPT

## 2021-08-19 PROCEDURE — 97140 MANUAL THERAPY 1/> REGIONS: CPT

## 2021-08-19 NOTE — OP THERAPY DAILY TREATMENT
Outpatient Physical Therapy  DAILY TREATMENT     [facility] Physical Therapy [address].  [address]  [place] [address]  Phone:  [phone]  Fax:  [phone]    Date: 08/19/2021    Patient: Diana Hurtado  YOB: 1970  MRN: [id number]     Time Calculation    Start time: 1020  Stop time: 1110 Time Calculation (min): 50 minutes         Chief Complaint: No chief complaint on file.    Visit #: 11    SUBJECTIVE:  Better for a day  And less pain with sleeping but still a problem with static postionOBJECTIVE:  L SIJ--pain with sacral mobs  L sacral base high  L asis high  Met for sacral and inominae          Therapeutic Treatments and Modalities:     Therapeutic Treatment and Modalities Summary: MET: correct sacral rotation and inominate rotation// no pain  Sacral float  Sacral stab on roller with marching  Hip rotator progression --bilateral, to fatigue  S/l top  leg ER(clams)  w/ knee bent and in front of bottom--IR--raise foot as high as possible--performed @  90degrees hip   Mechanical traction ---- 90/50 x 60/20 x 15' w/ mhp          Time-based treatments/modalities:    Physical Therapy Timed Treatment Charges  Manual therapy minutes (CPT 79592): 10 minutes  Therapeutic exercise minutes (CPT 00135): 30 minutes      Pain rating (1-10) before treatment: 1 /10 back L gluts--SIJ  Pain rating (1-10) after treatment:  No pain    ASSESSMENT:   Noted inomiate and sacral rotation with abolition of pain afterwards--noted weak hip rotators and likmited sacral stabiliy  PLAN/RECOMMENDATIONS: assess ASIS, possible SI belt , core stab,  manual treatment, traction and DN as indicated, assess pelvis  D/c 1-2 visits

## 2021-08-24 ENCOUNTER — PHYSICAL THERAPY (OUTPATIENT)
Dept: PHYSICAL THERAPY | Facility: REHABILITATION | Age: 51
End: 2021-08-24
Attending: NURSE PRACTITIONER
Payer: COMMERCIAL

## 2021-08-24 DIAGNOSIS — M54.50 ACUTE LEFT-SIDED LOW BACK PAIN WITHOUT SCIATICA: ICD-10-CM

## 2021-08-24 PROCEDURE — 97110 THERAPEUTIC EXERCISES: CPT

## 2021-08-24 PROCEDURE — 97140 MANUAL THERAPY 1/> REGIONS: CPT

## 2021-08-24 PROCEDURE — 97012 MECHANICAL TRACTION THERAPY: CPT

## 2021-08-24 NOTE — OP THERAPY DAILY TREATMENT
Outpatient Physical Therapy  DAILY TREATMENT     [facility] Physical Therapy [address].  [address]  [place] [address]  Phone:  [phone]  Fax:  [phone]    Date: 08/24/2021    Patient: Diana Hurtado  YOB: 1970  MRN: [id number]     Time Calculation    Start time: 1020  Stop time: 1100 Time Calculation (min): 40 minutes         Chief Complaint: No chief complaint on file.    Visit #: 12    SUBJECTIVE:  Much better, no pain during the day and able to sit for 30' w/o pain.. still   OBJECTIVE:     sacral bases leve  L asis high  Lessening TTP sacral base          Therapeutic Treatments and Modalities:     Therapeutic Treatment and Modalities Summary: MET: correct sacral rotation and inominate rotation// no pain  Sacral float  Sacral stab on roller with marching--reviewed  Hip rotator progression --bilateral, to fatigue  S/l top  leg ER(clams)  w/ knee bent and in front of bottom--IR--raise foot as high as possible--performed @  90degrees hip   --s/l bottom leg add to fatgiue  Mechanical traction ---- 90/50 x 60/20 x 15' w/ mhp          Time-based treatments/modalities:    Physical Therapy Timed Treatment Charges  Manual therapy minutes (CPT 81115): 10 minutes  Therapeutic exercise minutes (CPT 30903): 20 minutes      Pain rating (1-10) before treatment: 0/10 no pain  Pain rating (1-10) after treatment:  No pain    ASSESSMENT:   Much better since last visit with some  Cont. discomfort in supine at night w/ slight pelvic obliquity  --noted  decreased TTP about  sacral bases  PLAN/RECOMMENDATIONS: assess ASIS, possible SI belt , core stab,  manual treatment, traction and DN as indicated, assess pelvis  D/c 1-2 visits

## 2021-08-26 ENCOUNTER — PHYSICAL THERAPY (OUTPATIENT)
Dept: PHYSICAL THERAPY | Facility: REHABILITATION | Age: 51
End: 2021-08-26
Attending: NURSE PRACTITIONER
Payer: COMMERCIAL

## 2021-08-26 DIAGNOSIS — M54.50 ACUTE LEFT-SIDED LOW BACK PAIN WITHOUT SCIATICA: ICD-10-CM

## 2021-08-26 PROCEDURE — 97140 MANUAL THERAPY 1/> REGIONS: CPT

## 2021-08-26 PROCEDURE — 97014 ELECTRIC STIMULATION THERAPY: CPT

## 2021-08-26 NOTE — OP THERAPY DAILY TREATMENT
"  Outpatient Physical Therapy  DAILY TREATMENT     [facility] Physical [address].  [address]  [place] [address]  Phone:  [phone]  Fax:  [phone]    Date: 08/26/2021    Patient: Diana Hurtado  YOB: 1970  MRN: [id number]     Time Calculation    Start time: 1015  Stop time: 1130 Time Calculation (min): 75 minutes         Chief Complaint: No chief complaint on file.    Visit #: 13    SUBJECTIVE:  Cont. To report that she is doing much better with minimal pain during the day w/ some l/s \"tightness\"  OBJECTIVE:     sacral bases leve  L asis high  Palpable guarding L mulitfidi throughout l/s          Therapeutic Treatments and Modalities:     Therapeutic Treatment and Modalities Summary: Patient \" brake the stick\" self MET: inominate rotation// no pain  Trial sacral belt  DN: Patient signed informed written release and verbally agreed with informed consent to procedure of dry needling   skin prep with Chlora prep  -L t12-l5 multifidi and pecking to L post. illium (ILL attachment)  -TENS w/ FDN  -MHP x 10'  -No adverse reactions observed post treatment          Time-based treatments/modalities:    Physical Therapy Timed Treatment Charges  Manual therapy minutes (CPT 25243): 25 minutes  Therapeutic exercise minutes (CPT 58377): 5 minutes      Pain rating (1-10) before treatment: 0/10 no pain\" just tight\"  Pain rating (1-10) after treatment:  Sore from needles    ASSESSMENT:   Cont. To reports improvement with some  Cont. discomfort in supine at night w/ slight pelvic obliquity    PLAN/RECOMMENDATIONS: assess ASIS(patient to puirchase SI belt) , core stab,  manual treatment, traction and DN as indicated, assess pelvis  D/c 1-2 visits       "

## 2021-08-31 ENCOUNTER — PHYSICAL THERAPY (OUTPATIENT)
Dept: PHYSICAL THERAPY | Facility: REHABILITATION | Age: 51
End: 2021-08-31
Attending: NURSE PRACTITIONER
Payer: COMMERCIAL

## 2021-08-31 DIAGNOSIS — M54.50 ACUTE LEFT-SIDED LOW BACK PAIN WITHOUT SCIATICA: ICD-10-CM

## 2021-08-31 PROCEDURE — 97110 THERAPEUTIC EXERCISES: CPT

## 2021-08-31 PROCEDURE — 97140 MANUAL THERAPY 1/> REGIONS: CPT

## 2021-08-31 NOTE — OP THERAPY DAILY TREATMENT
"  Outpatient Physical Therapy  DAILY TREATMENT     [facility] Physical Therapy [address]  [address]  [place] [address]  Phone:  [phone]  Fax:  [phone]    Date: 08/31/2021    Patient: Diana Hurtado  YOB: 1970  MRN: [id number]     Time Calculation    Start time: 1022  Stop time: 1105 Time Calculation (min): 43 minutes         Chief Complaint: No chief complaint on file.    Visit #: 14    SUBJECTIVE:  Cont. To improve w/o significant decrease in constant pain--cont. To report being \" uncomfortable at night\"--still having ILL region pain  OBJECTIVE:    L asis high\" feel it lying down\"  Palpable guarding L mulitfidi throughout l/s          Therapeutic Treatments and Modalities:     Therapeutic Treatment and Modalities Summary: Patient \" brake the stick\" self MET: inominate rotation-->   sacral roller with ex.  REIL with PT o/p  P/a l4-5 gd 4  stm to R ILL  Cupping to R ILL with rocking  Don sacral belt--reviewed use and positioning  [facility] x 10'              Time-based treatments/modalities:    Physical Therapy Timed Treatment Charges  Manual therapy minutes (CPT 96051): 10 minutes  Therapeutic exercise minutes (CPT 30897): 30 minutes      Pain rating (1-10) before treatment: 0/10 no pain\" just tight\"  Pain rating (1-10) after treatment:  Sore from needles    ASSESSMENT:   Cont. To reports improvement with some  Cont. discomfort in supine at night w/ slight pelvic obliquity --iniatated SI loc that patient purchased  PLAN/RECOMMENDATIONS: assess ASIS(patient to puirchase SI belt) , core stab,  manual treatment, traction and DN as indicated, assess pelvis  D/c 1-2 visits       "

## 2021-09-09 ENCOUNTER — PHYSICAL THERAPY (OUTPATIENT)
Dept: PHYSICAL THERAPY | Facility: REHABILITATION | Age: 51
End: 2021-09-09
Attending: NURSE PRACTITIONER
Payer: COMMERCIAL

## 2021-09-09 DIAGNOSIS — M54.50 ACUTE LEFT-SIDED LOW BACK PAIN WITHOUT SCIATICA: ICD-10-CM

## 2021-09-09 PROCEDURE — 97110 THERAPEUTIC EXERCISES: CPT

## 2021-09-09 NOTE — OP THERAPY DAILY TREATMENT
"  Outpatient Physical Therapy  DAILY TREATMENT     [facility] Physical Therapy [address].  [address]  [place] [address]  Phone:  [phone]  Fax:  [phone]    Date: 09/09/2021    Patient: Diana Hurtado  YOB: 1970  MRN: [id number]     Time Calculation    Start time: 0848  Stop time: 0917 Time Calculation (min): 29 minutes         Chief Complaint: No chief complaint on file.    Visit #: 15    SUBJECTIVE:  Much better...occasional pain only with sitting tto long and ome ache at night.  Too cumbersome to wear belt at work  OBJECTIVE:    L asis high\" feel it lying down\"  Biering-terrazas: 42\"          Therapeutic Treatments and Modalities:     Therapeutic Treatment and Modalities Summary: supermeans 3 x 30-45\"--instructed progression with weight  Planks x 4--focus on L side plank and back plank  MET: corrected illac asymmetry after ex.              Time-based treatments/modalities:    Physical Therapy Timed Treatment Charges  Therapeutic exercise minutes (CPT 94743): 25 minutes      Pain rating (1-10) before treatment: 0/10 no pain\" just tight\"  Pain rating (1-10) after treatment:  Sore from needles    ASSESSMENT:   Cont. To reports improvement with noted limits in paraspinal endurance and L sided pelvic/multifidi strength with weakn psot chain control  PLAN/RECOMMENDATIONS: wear SI belt when feasible) , core stab,  manual treatment, traction and DN as indicated, assess pelvis  D/c 1-2 visits       "

## 2021-10-01 ENCOUNTER — IMMUNIZATION (OUTPATIENT)
Dept: OCCUPATIONAL MEDICINE | Facility: CLINIC | Age: 51
End: 2021-10-01

## 2021-10-01 DIAGNOSIS — Z23 NEED FOR VACCINATION: Primary | ICD-10-CM

## 2021-10-01 PROCEDURE — 90686 IIV4 VACC NO PRSV 0.5 ML IM: CPT | Performed by: PREVENTIVE MEDICINE

## 2021-11-15 ENCOUNTER — HOSPITAL ENCOUNTER (OUTPATIENT)
Dept: RADIOLOGY | Facility: MEDICAL CENTER | Age: 51
End: 2021-11-15
Attending: INTERNAL MEDICINE
Payer: COMMERCIAL

## 2021-11-15 DIAGNOSIS — Z12.31 VISIT FOR SCREENING MAMMOGRAM: ICD-10-CM

## 2021-11-15 PROCEDURE — 77063 BREAST TOMOSYNTHESIS BI: CPT

## 2021-12-01 ENCOUNTER — EH NON-PROVIDER (OUTPATIENT)
Dept: OCCUPATIONAL MEDICINE | Facility: CLINIC | Age: 51
End: 2021-12-01

## 2021-12-01 DIAGNOSIS — Z02.89 ENCOUNTER FOR OCCUPATIONAL HEALTH EXAMINATION INVOLVING RESPIRATOR: ICD-10-CM

## 2021-12-01 PROCEDURE — 94375 RESPIRATORY FLOW VOLUME LOOP: CPT | Performed by: NURSE PRACTITIONER

## 2022-01-17 ENCOUNTER — HOSPITAL ENCOUNTER (EMERGENCY)
Facility: MEDICAL CENTER | Age: 52
End: 2022-01-17
Attending: EMERGENCY MEDICINE
Payer: COMMERCIAL

## 2022-01-17 ENCOUNTER — APPOINTMENT (OUTPATIENT)
Dept: RADIOLOGY | Facility: MEDICAL CENTER | Age: 52
End: 2022-01-17
Attending: EMERGENCY MEDICINE
Payer: COMMERCIAL

## 2022-01-17 VITALS
SYSTOLIC BLOOD PRESSURE: 130 MMHG | RESPIRATION RATE: 18 BRPM | HEART RATE: 59 BPM | OXYGEN SATURATION: 98 % | WEIGHT: 216.05 LBS | HEIGHT: 68 IN | BODY MASS INDEX: 32.74 KG/M2 | TEMPERATURE: 98.2 F | DIASTOLIC BLOOD PRESSURE: 89 MMHG

## 2022-01-17 DIAGNOSIS — R10.9 FLANK PAIN: ICD-10-CM

## 2022-01-17 LAB
ALBUMIN SERPL BCP-MCNC: 4.1 G/DL (ref 3.2–4.9)
ALBUMIN/GLOB SERPL: 1.5 G/DL
ALP SERPL-CCNC: 74 U/L (ref 30–99)
ALT SERPL-CCNC: 13 U/L (ref 2–50)
ANION GAP SERPL CALC-SCNC: 9 MMOL/L (ref 7–16)
APPEARANCE UR: CLEAR
AST SERPL-CCNC: 15 U/L (ref 12–45)
BASOPHILS # BLD AUTO: 0.5 % (ref 0–1.8)
BASOPHILS # BLD: 0.05 K/UL (ref 0–0.12)
BILIRUB SERPL-MCNC: 0.5 MG/DL (ref 0.1–1.5)
BILIRUB UR QL STRIP.AUTO: NEGATIVE
BUN SERPL-MCNC: 13 MG/DL (ref 8–22)
CALCIUM SERPL-MCNC: 8.8 MG/DL (ref 8.4–10.2)
CHLORIDE SERPL-SCNC: 104 MMOL/L (ref 96–112)
CO2 SERPL-SCNC: 24 MMOL/L (ref 20–33)
COLOR UR: YELLOW
CREAT SERPL-MCNC: 0.85 MG/DL (ref 0.5–1.4)
EOSINOPHIL # BLD AUTO: 0.16 K/UL (ref 0–0.51)
EOSINOPHIL NFR BLD: 1.6 % (ref 0–6.9)
ERYTHROCYTE [DISTWIDTH] IN BLOOD BY AUTOMATED COUNT: 39.2 FL (ref 35.9–50)
GLOBULIN SER CALC-MCNC: 2.8 G/DL (ref 1.9–3.5)
GLUCOSE SERPL-MCNC: 103 MG/DL (ref 65–99)
GLUCOSE UR STRIP.AUTO-MCNC: NEGATIVE MG/DL
HCG SERPL QL: NEGATIVE
HCT VFR BLD AUTO: 44.1 % (ref 37–47)
HGB BLD-MCNC: 15.4 G/DL (ref 12–16)
IMM GRANULOCYTES # BLD AUTO: 0.03 K/UL (ref 0–0.11)
IMM GRANULOCYTES NFR BLD AUTO: 0.3 % (ref 0–0.9)
KETONES UR STRIP.AUTO-MCNC: NEGATIVE MG/DL
LEUKOCYTE ESTERASE UR QL STRIP.AUTO: NEGATIVE
LIPASE SERPL-CCNC: 23 U/L (ref 7–58)
LYMPHOCYTES # BLD AUTO: 2.87 K/UL (ref 1–4.8)
LYMPHOCYTES NFR BLD: 29.2 % (ref 22–41)
MCH RBC QN AUTO: 31 PG (ref 27–33)
MCHC RBC AUTO-ENTMCNC: 34.9 G/DL (ref 33.6–35)
MCV RBC AUTO: 88.9 FL (ref 81.4–97.8)
MICRO URNS: NORMAL
MONOCYTES # BLD AUTO: 0.71 K/UL (ref 0–0.85)
MONOCYTES NFR BLD AUTO: 7.2 % (ref 0–13.4)
NEUTROPHILS # BLD AUTO: 6.01 K/UL (ref 2–7.15)
NEUTROPHILS NFR BLD: 61.2 % (ref 44–72)
NITRITE UR QL STRIP.AUTO: NEGATIVE
NRBC # BLD AUTO: 0 K/UL
NRBC BLD-RTO: 0 /100 WBC
PH UR STRIP.AUTO: 6.5 [PH] (ref 5–8)
PLATELET # BLD AUTO: 198 K/UL (ref 164–446)
PMV BLD AUTO: 10.5 FL (ref 9–12.9)
POTASSIUM SERPL-SCNC: 4.2 MMOL/L (ref 3.6–5.5)
PROT SERPL-MCNC: 6.9 G/DL (ref 6–8.2)
PROT UR QL STRIP: NEGATIVE MG/DL
RBC # BLD AUTO: 4.96 M/UL (ref 4.2–5.4)
RBC UR QL AUTO: NEGATIVE
SODIUM SERPL-SCNC: 137 MMOL/L (ref 135–145)
SP GR UR STRIP.AUTO: 1.01
WBC # BLD AUTO: 9.8 K/UL (ref 4.8–10.8)

## 2022-01-17 PROCEDURE — 700111 HCHG RX REV CODE 636 W/ 250 OVERRIDE (IP): Performed by: EMERGENCY MEDICINE

## 2022-01-17 PROCEDURE — 96372 THER/PROPH/DIAG INJ SC/IM: CPT

## 2022-01-17 PROCEDURE — 99284 EMERGENCY DEPT VISIT MOD MDM: CPT

## 2022-01-17 PROCEDURE — 85025 COMPLETE CBC W/AUTO DIFF WBC: CPT

## 2022-01-17 PROCEDURE — 80053 COMPREHEN METABOLIC PANEL: CPT

## 2022-01-17 PROCEDURE — 84703 CHORIONIC GONADOTROPIN ASSAY: CPT

## 2022-01-17 PROCEDURE — 81003 URINALYSIS AUTO W/O SCOPE: CPT

## 2022-01-17 PROCEDURE — A9270 NON-COVERED ITEM OR SERVICE: HCPCS | Performed by: EMERGENCY MEDICINE

## 2022-01-17 PROCEDURE — 83690 ASSAY OF LIPASE: CPT

## 2022-01-17 PROCEDURE — 700102 HCHG RX REV CODE 250 W/ 637 OVERRIDE(OP): Performed by: EMERGENCY MEDICINE

## 2022-01-17 PROCEDURE — 74176 CT ABD & PELVIS W/O CONTRAST: CPT

## 2022-01-17 RX ORDER — NAPROXEN 500 MG/1
250 TABLET ORAL 2 TIMES DAILY WITH MEALS
Qty: 20 TABLET | Refills: 0 | Status: SHIPPED | OUTPATIENT
Start: 2022-01-17 | End: 2022-09-16

## 2022-01-17 RX ORDER — KETOROLAC TROMETHAMINE 30 MG/ML
30 INJECTION, SOLUTION INTRAMUSCULAR; INTRAVENOUS ONCE
Status: COMPLETED | OUTPATIENT
Start: 2022-01-17 | End: 2022-01-17

## 2022-01-17 RX ADMIN — MAGNESIUM HYDROXIDE 30 ML: 400 SUSPENSION ORAL at 15:28

## 2022-01-17 RX ADMIN — KETOROLAC TROMETHAMINE 30 MG: 30 INJECTION, SOLUTION INTRAMUSCULAR at 14:16

## 2022-01-17 NOTE — ED TRIAGE NOTES
"Chief Complaint   Patient presents with   • Urinary Frequency     x 4 days   • Flank Pain     Right, started yesterday   • Nausea     /94   Pulse 72   Temp 36.4 °C (97.5 °F) (Temporal)   Resp 16   Ht 1.727 m (5' 8\")   Wt 98 kg (216 lb 0.8 oz)   SpO2 96%   BMI 32.85 kg/m²     Has this patient been vaccinated for COVID YES  If not, would they like to be vaccinated while in the ER if eligible?  na  Would the patient like to speak with the ERP about the possibility of receiving the COVID vaccine today before making a decision? Na    Pt ambulated to ED by self for c/o urinary frequency and Right Flank pain.  Pt denies dysuria at this time.        "

## 2022-01-17 NOTE — ED PROVIDER NOTES
"ED Provider  Scribed for Mo Ralph D.O. by Stacy Jose. 1/17/2022  1:21 PM    Means of arrival:Walk-In  History obtained from:Patient  History limited by: None    CHIEF COMPLAINT  Chief Complaint   Patient presents with    Urinary Frequency     x 4 days    Flank Pain     Right, started yesterday    Nausea       HPI  Diana Hurtado is a 51 y.o. female who presents for mild urinary frequency onset 4 days. She states that she doesn't have to run to the bathroom, however wakes up in the middle of the night. She has associated right-sided upper flank pain that began yesterday. She currently rates the pain as 7/10 in the ED. She also reports of nausea. She denies hematuria or fever. She denies a history of kidney or urinary infections, or cholecystectomy.     REVIEW OF SYSTEMS  See HPI for further details.     PAST MEDICAL HISTORY   has a past medical history of Dyslipidemia (5/9/2013).    SOCIAL HISTORY  Social History     Tobacco Use    Smoking status: Never Smoker    Smokeless tobacco: Never Used   Substance and Sexual Activity    Alcohol use: Yes    Drug use: No    Sexual activity: Yes       SURGICAL HISTORY   has a past surgical history that includes tmj implant removal.    CURRENT MEDICATIONS  No current outpatient medications     ALLERGIES  Allergies   Allergen Reactions    Latex Hives and Rash       PHYSICAL EXAM  VITAL SIGNS: /94   Pulse 72   Temp 36.4 °C (97.5 °F) (Temporal)   Resp 16   Ht 1.727 m (5' 8\")   Wt 98 kg (216 lb 0.8 oz)   SpO2 96%   BMI 32.85 kg/m²   Constitutional: Alert in no apparent distress.  HENT: No signs of trauma, mucous membranes are moist  Eyes: Conjunctiva normal, Non-icteric.   Neck: Normal range of motion, No tenderness, Supple.  Lymphatic: No lymphadenopathy noted.   Cardiovascular: Regular rate and rhythm, no murmurs.   Thorax & Lungs: Normal breath sounds, No respiratory distress, No wheezing, No chest tenderness.   Abdomen: Bowel sounds normal, Soft, " No masses, No pulsatile masses. No peritoneal signs. Mild right upper flank tenderness.   Skin: Warm, Dry, normal color.   Back: No bony tenderness, No CVA tenderness.   Extremities: No edema, No tenderness, No cyanosis  Musculoskeletal: Good range of motion in all major joints. No tenderness to palpation or major deformities noted.   Neurologic: Alert and oriented x4, Normal motor function, Normal sensory function, No focal deficits noted.   Psychiatric: Affect normal, Judgment normal, Mood normal.     DIAGNOSTIC STUDIES / PROCEDURES    LABS  Results for orders placed or performed during the hospital encounter of 01/17/22   CBC WITH DIFFERENTIAL   Result Value Ref Range    WBC 9.8 4.8 - 10.8 K/uL    RBC 4.96 4.20 - 5.40 M/uL    Hemoglobin 15.4 12.0 - 16.0 g/dL    Hematocrit 44.1 37.0 - 47.0 %    MCV 88.9 81.4 - 97.8 fL    MCH 31.0 27.0 - 33.0 pg    MCHC 34.9 33.6 - 35.0 g/dL    RDW 39.2 35.9 - 50.0 fL    Platelet Count 198 164 - 446 K/uL    MPV 10.5 9.0 - 12.9 fL    Neutrophils-Polys 61.20 44.00 - 72.00 %    Lymphocytes 29.20 22.00 - 41.00 %    Monocytes 7.20 0.00 - 13.40 %    Eosinophils 1.60 0.00 - 6.90 %    Basophils 0.50 0.00 - 1.80 %    Immature Granulocytes 0.30 0.00 - 0.90 %    Nucleated RBC 0.00 /100 WBC    Neutrophils (Absolute) 6.01 2.00 - 7.15 K/uL    Lymphs (Absolute) 2.87 1.00 - 4.80 K/uL    Monos (Absolute) 0.71 0.00 - 0.85 K/uL    Eos (Absolute) 0.16 0.00 - 0.51 K/uL    Baso (Absolute) 0.05 0.00 - 0.12 K/uL    Immature Granulocytes (abs) 0.03 0.00 - 0.11 K/uL    NRBC (Absolute) 0.00 K/uL   COMP METABOLIC PANEL   Result Value Ref Range    Sodium 137 135 - 145 mmol/L    Potassium 4.2 3.6 - 5.5 mmol/L    Chloride 104 96 - 112 mmol/L    Co2 24 20 - 33 mmol/L    Anion Gap 9.0 7.0 - 16.0    Glucose 103 (H) 65 - 99 mg/dL    Bun 13 8 - 22 mg/dL    Creatinine 0.85 0.50 - 1.40 mg/dL    Calcium 8.8 8.4 - 10.2 mg/dL    AST(SGOT) 15 12 - 45 U/L    ALT(SGPT) 13 2 - 50 U/L    Alkaline Phosphatase 74 30 - 99 U/L     Total Bilirubin 0.5 0.1 - 1.5 mg/dL    Albumin 4.1 3.2 - 4.9 g/dL    Total Protein 6.9 6.0 - 8.2 g/dL    Globulin 2.8 1.9 - 3.5 g/dL    A-G Ratio 1.5 g/dL   LIPASE   Result Value Ref Range    Lipase 23 7 - 58 U/L   URINALYSIS,CULTURE IF INDICATED    Specimen: Urine   Result Value Ref Range    Color Yellow     Character Clear     Specific Gravity 1.015 <1.035    Ph 6.5 5.0 - 8.0    Glucose Negative Negative mg/dL    Ketones Negative Negative mg/dL    Protein Negative Negative mg/dL    Bilirubin Negative Negative    Nitrite Negative Negative    Leukocyte Esterase Negative Negative    Occult Blood Negative Negative    Micro Urine Req see below    HCG QUAL SERUM   Result Value Ref Range    Beta-Hcg Qualitative Serum Negative Negative   ESTIMATED GFR   Result Value Ref Range    GFR If African American >60 >60 mL/min/1.73 m 2    GFR If Non African American >60 >60 mL/min/1.73 m 2      All labs reviewed by me.    RADIOLOGY   CT-RENAL COLIC EVALUATION(A/P W/O)   Final Result         1.  No evidence of urolithiasis.      2.  Probable left lower pole parapelvic cysts versus calyceal dilation.      3.  Hepatic cyst.      4.  Small hiatal hernia.        The radiologist's interpretations of all radiological studies have been reviewed by me.    Films have been independently by me      COURSE  Pertinent Labs & Imaging studies reviewed. (See chart for details)     1:21 PM - Patient seen and examined at bedside. Discussed plan of care, including evaluating for infection and gallbladder disease. The patient will be medicated with Toradol 30 mg. Ordered for HCG Qual Serum, CBC w/ diff, CMP, Lipase, and UA to evaluate her symptoms.     2:05 PM - Patient was reevaluated at bedside. She was updated on the findings of her lab results. Her pain is unchanged. Discussed further plan of care including obtaining a CT of her kidneys.     3:18 PM - Patient was reevaluated at bedside. Updated her on her CT results. I informed the patient for  plans of discharge and return instructions. Patient verbalizes understanding and agreement to this plan of care.       MEDICAL DECISION MAKING  This is a 51 y.o. female who presents as a complains of urinary frequency.  She has right flank pain that radiates to the right upper quadrant.  There is concerns for pyelonephritis, urinary tract infection and gallbladder disease.  Lab test and urine test that shows no signs of infection or gallbladder disease.  With the significant pain that she is having CT was done which shows no pathology.  She has mild amount of stool on the right side which can cause some irritation and pain.  Lastly was given to attempt to see if this will improve her pain.    This time she is stable for discharge home with symptomatic care and follow-up with primary doctor    The patient will return for new or worsening symptoms and is stable at the time of discharge.    DISPOSITION:  Patient will be discharged home in stable condition.    FOLLOW UP:  Sancho Sanon D.O.  [id number] [address]  [phone]    In 1 week      OUTPATIENT MEDICATIONS:  Discharge Medication List as of 1/17/2022  3:44 PM        START taking these medications    Details   naproxen (NAPROSYN) 500 MG Tab Take 0.5 Tablets by mouth 2 times a day with meals., Disp-20 Tablet, R-0, Normal              FINAL IMPRESSION  1. Flank pain         Stacy SENIOR (Niraj), am scribing for, and in the presence of, Mo Ralph D.O..    Electronically signed by: Stacy Ott), 1/17/2022    Mo SENIOR D.O. personally performed the services described in this documentation, as scribed by Stacy Jose in my presence, and it is both accurate and complete.    The note accurately reflects work and decisions made by me.  Mo Ralph D.O.  1/17/2022  6:04 PM

## 2022-01-17 NOTE — DISCHARGE INSTRUCTIONS
CT scan shows no signs of kidney infection, kidney stones, gallstones, or appendicitis.    Your urinalysis is negative and your blood counts are normal.    You do have some stool in the right side of the colon which may be causing some irritation and discomfort you are being given a laxative, this will cause loose stools by the end of the day today.    Follow-up with your primary doctor return here if symptoms change or worsen.

## 2022-01-20 ENCOUNTER — PHARMACY VISIT (OUTPATIENT)
Dept: PHARMACY | Facility: MEDICAL CENTER | Age: 52
End: 2022-01-20
Payer: COMMERCIAL

## 2022-01-20 ENCOUNTER — TELEPHONE (OUTPATIENT)
Dept: MEDICAL GROUP | Facility: LAB | Age: 52
End: 2022-01-20

## 2022-01-20 DIAGNOSIS — B02.9 HERPES ZOSTER WITHOUT COMPLICATION: ICD-10-CM

## 2022-01-20 PROCEDURE — RXMED WILLOW AMBULATORY MEDICATION CHARGE: Performed by: INTERNAL MEDICINE

## 2022-01-20 PROCEDURE — RXOTC WILLOW AMBULATORY OTC CHARGE

## 2022-01-20 RX ORDER — VALACYCLOVIR HYDROCHLORIDE 1 G/1
1000 TABLET, FILM COATED ORAL 3 TIMES DAILY
Qty: 21 TABLET | Refills: 0 | Status: SHIPPED | OUTPATIENT
Start: 2022-01-20 | End: 2022-09-16

## 2022-01-27 ENCOUNTER — TELEPHONE (OUTPATIENT)
Dept: PHYSICAL THERAPY | Facility: REHABILITATION | Age: 52
End: 2022-01-27

## 2022-01-27 NOTE — OP THERAPY DISCHARGE SUMMARY
Outpatient Physical Therapy  DISCHARGE SUMMARY NOTE      [facility] Physical [address].  [address]  [place] [address]  Phone:  [phone]  Fax:  [phone]    Date of Visit: 01/27/2022    Patient: Diana Hurtado  YOB: 1970  MRN: [id number]     Referring Provider:  CONSTANCE Block     Referring Diagnosis  Acute left-sided low back pain without sciatica M54.5             Functional Assessment Used        Your patient is being discharged from Physical Therapy with the following comments:   · Goals met  · Goals partially met   Acute left-sided low back pain without sciatica M54.5       ASSESSMENT:   Cont. To reports improvement with noted limits in paraspinal endurance and L sided pelvic/multifidi strength with weakn psot chain control  PLAN/RECOMMENDATIONS:   No patient contact since   9/ 9 / 21 .  Patient is independent with her HEP and is being discharged from therapy at this time.            Danyel Robison, PT, DPT, OCS    Date: 1/27/2022

## 2022-02-11 NOTE — TELEPHONE ENCOUNTER
Telephone call received from the patient, was seen in the emergency room because of pain, later started to develop a rash this past Tuesday, states that she has a rash undergoing left breast, does not cross midline consistent with probable herpes zoster.  We will have the patient start Valtrex 1000 mg 3 times a day   Dry/Cool

## 2022-03-16 ENCOUNTER — TELEMEDICINE (OUTPATIENT)
Dept: MEDICAL GROUP | Facility: LAB | Age: 52
End: 2022-03-16
Payer: COMMERCIAL

## 2022-03-16 VITALS — WEIGHT: 205 LBS | BODY MASS INDEX: 31.07 KG/M2 | HEIGHT: 68 IN

## 2022-03-16 DIAGNOSIS — M54.50 ACUTE LEFT-SIDED LOW BACK PAIN WITHOUT SCIATICA: ICD-10-CM

## 2022-03-16 PROCEDURE — 99213 OFFICE O/P EST LOW 20 MIN: CPT | Mod: 95 | Performed by: PHYSICIAN ASSISTANT

## 2022-03-16 ASSESSMENT — FIBROSIS 4 INDEX: FIB4 SCORE: 1.05

## 2022-03-16 NOTE — PROGRESS NOTES
"Virtual Visit: Established Patient   This visit was conducted via Zoom using secure and encrypted videoconferencing technology.   The patient was in their home in the state [facility].    The patient's identity was confirmed and verbal consent was obtained for this virtual visit.    Subjective:   CC:   Chief Complaint   Patient presents with   • Back Pain     Diana Hurtado is a 51 y.o. female presenting for evaluation and management of:    Back Pain  Pt reports she has had back pain for a few weeks now.  Pt slipped near the tub a few weeks ago and felt a twinge in her back. Near L SI joint and L IT band. Worse with sitting or laying on side. Improved with laying down. Worse with standing.     Similar episode, improved with physical therapy    OTC: advil OTc with some improvement short term    No previous hx of back surgery.     ROS   No fever/chills  No N/V/D  No numbness/tingling, no saddle paresthesia, no bowel or bladder incontinence  Denies CP, SOB, palpitations, cough    Current medicines (including changes today)  Current Outpatient Medications   Medication Sig Dispense Refill   • valacyclovir (VALTREX) 1 GM Tab Take 1 Tablet by mouth 3 times a day. 21 Tablet 0   • naproxen (NAPROSYN) 500 MG Tab Take 0.5 Tablets by mouth 2 times a day with meals. 20 Tablet 0     No current facility-administered medications for this visit.       Patient Active Problem List    Diagnosis Date Noted   • Dyslipidemia 05/09/2013        Objective:   Ht 1.727 m (5' 8\")   Wt 93 kg (205 lb)   BMI 31.17 kg/m²     Physical Exam:  Constitutional: Alert, no distress, well-groomed.  Skin: No rashes in visible areas.  Eye: Round. Conjunctiva clear, lids normal. No icterus.   ENMT: Lips pink without lesions, good dentition, moist mucous membranes. Phonation normal.  Neck: No masses, no thyromegaly. Moves freely without pain.  Respiratory: Unlabored respiratory effort, no cough or audible wheeze  Psych: Alert and oriented x3, normal " affect and mood.     Assessment and Plan:   The following treatment plan was discussed:     1. Acute left-sided low back pain without sciatica  - Referral to Physical Therapy  continue OTC advil PRN  F/u if symptoms worsen or do not improve    Follow-up: Return if symptoms worsen or fail to improve.

## 2022-03-17 ENCOUNTER — PHYSICAL THERAPY (OUTPATIENT)
Dept: PHYSICAL THERAPY | Facility: REHABILITATION | Age: 52
End: 2022-03-17
Attending: PHYSICIAN ASSISTANT
Payer: COMMERCIAL

## 2022-03-17 DIAGNOSIS — M54.50 ACUTE LEFT-SIDED LOW BACK PAIN WITHOUT SCIATICA: ICD-10-CM

## 2022-03-17 PROCEDURE — 97161 PT EVAL LOW COMPLEX 20 MIN: CPT

## 2022-03-17 PROCEDURE — 97014 ELECTRIC STIMULATION THERAPY: CPT

## 2022-03-17 PROCEDURE — 97140 MANUAL THERAPY 1/> REGIONS: CPT

## 2022-03-17 PROCEDURE — 97012 MECHANICAL TRACTION THERAPY: CPT

## 2022-03-17 ASSESSMENT — ENCOUNTER SYMPTOMS
PAIN SCALE AT HIGHEST: 9
PAIN SCALE: 8

## 2022-03-17 NOTE — OP THERAPY EVALUATION
Outpatient Physical Therapy  INITIAL EVALUATION    [facility] Physical Therapy [address].  [address]  [place] [address]  Phone:  [phone]  Fax:  [phone]    Date of Evaluation: 2022    Patient: Diana Hurtado  YOB: 1970  MRN: [id number]     Referring Provider: Candida Carmona P.A.-C.  [address] [facility] [address]  [place] [address]   Referring Diagnosis Acute left-sided low back pain without sciatica [M54.50]     Time Calculation  Start time: 1020  Stop time: 1130 Time Calculation (min): 70 minutes         Chief Complaint: No chief complaint on file.    Visit Diagnoses     ICD-10-CM   1. Acute left-sided low back pain without sciatica  M54.50       Date of onset of impairment: 2022    Subjective   History of Present Illness:     History of chief complaint:  Patient reported slipping on floor and felt a jerk in her back and has been in severe pain since    Prior level of function:  PT supervisor    Pain:     Current pain ratin    At worst pain ratin    Location:  L sacral base    Aggravating factors:  Sit more than 5'   Walk stand more than 8'  RMQ < 10/24  Sleep through night        Past Medical History:   Diagnosis Date   • Dyslipidemia 2013     Past Surgical History:   Procedure Laterality Date   • TMJ IMPLANT REMOVAL         Precautions:       Objective   Range of motion and strength:    L asis High          Exercises/Treatment  Time-based treatments/modalities:    Physical Therapy Timed Treatment Charges  Manual therapy minutes (CPT 91122): 15 minutes  Therapeutic exercise minutes (CPT 32642): 10 minutes      Assessment, Response and Plan:   Assessment details:  Patient presents with L/S derangement  syndrome with  poor posture  and poor body awareness with decreased lordosis. Patient presents with axial load and flexion sensitivities. Patient presents noted pelvic obliquity w/ L sacral rotation. Patient centralized with Domingo  "extension protocol. Patient should do well if compliant with POC.  Prognosis: good    Goals:   Short Term Goals:   Sit more than 5'   Walk stand more than 8'  RMQ < 10/24  Sleep through night  Short term goal time span:  2-4 weeks      Long Term Goals:    Sit more than 30'   Walk stand more than 20\"  RMQ < 5/24    Long term goal time span:  6-8 weeks    Plan:   Therapy options:  Physical therapy treatment to continue  Planned therapy interventions:  E Stim Unattended (CPT 16325), Manual Therapy (CPT 88006), Mechanical Traction (CPT 96688) and Therapeutic Exercise (CPT 82294)  Other planned therapy interventions:  Dry needle  Frequency:  2x week  Duration in weeks:  8  Duration in visits:  16  Plan details:  MET to pelvis, assess for obliquity, DN, IASTM, core stab, sacral float      Functional Assessment Used        Referring provider co-signature:  I have reviewed this plan of care and my co-signature certifies the need for services.    Certification Period: 03/17/2022 to  05/20/22    Physician Signature: ________________________________ Date: ______________        "

## 2022-03-18 ENCOUNTER — PHYSICAL THERAPY (OUTPATIENT)
Dept: PHYSICAL THERAPY | Facility: REHABILITATION | Age: 52
End: 2022-03-18
Attending: PHYSICIAN ASSISTANT
Payer: COMMERCIAL

## 2022-03-18 DIAGNOSIS — M54.50 ACUTE LEFT-SIDED LOW BACK PAIN WITHOUT SCIATICA: ICD-10-CM

## 2022-03-18 PROCEDURE — 97140 MANUAL THERAPY 1/> REGIONS: CPT

## 2022-03-18 PROCEDURE — 97110 THERAPEUTIC EXERCISES: CPT

## 2022-03-18 PROCEDURE — 97014 ELECTRIC STIMULATION THERAPY: CPT

## 2022-03-18 NOTE — OP THERAPY DAILY TREATMENT
"  Outpatient Physical Therapy  DAILY TREATMENT     [facility] [address].  [address]  [place] [address]  Phone:  [phone]  Fax:  [phone]    Date: 03/18/2022    Patient: Diana Hurtado  YOB: 1970  MRN: [id number]     Time Calculation    Start time: 0205  Stop time: 0335 Time Calculation (min): 90 minutes         Chief Complaint: No chief complaint on file.    Visit #: 2    SUBJECTIVE:  Really sore right after last visit    OBJECTIVE:L ASIS high          Therapeutic Treatments and Modalities:     Therapeutic Treatment and Modalities Summary: Met innominate  seil with p/a w/ L rotation via ASIS  S/l ext rot HVLA bilateral// less, in the Middle\"  REIL with PT o/p with and without L hip flexion  DN: Patient signed informed written release and verbally agreed with informed consent to procedure of dry needling   skin prep with isopropyl  Alcohol  -bilateral t/s t9-12 and  L/S multifidis l2-s1  -TENS w/ FDN    -MHP x 10'  -No adverse reactions observed post treatment  SEIL with russian bialteral l/s w/ mhp    Time-based treatments/modalities:    Physical Therapy Timed Treatment Charges  Manual therapy minutes (CPT 17585): 30 minutes  Therapeutic exercise minutes (CPT 48146): 10 minutes      Pain rating (1-10) before treatment:  6 l glut  Pain rating (1-10) after treatment:  2 \" much better    ASSESSMENT:   Cont. To present with L asis High w/ patient reporting centralization after treatment.  Noted palptatory severe guarding in mutfidi L>R t10-s1    PLAN/RECOMMENDATIONS:   Progress dennis, progress core stab mobs/hvla, progress core stab and DN as indicated     "

## 2022-03-23 ENCOUNTER — PHYSICAL THERAPY (OUTPATIENT)
Dept: PHYSICAL THERAPY | Facility: REHABILITATION | Age: 52
End: 2022-03-23
Attending: PHYSICIAN ASSISTANT
Payer: COMMERCIAL

## 2022-03-23 DIAGNOSIS — M54.50 ACUTE LEFT-SIDED LOW BACK PAIN WITHOUT SCIATICA: ICD-10-CM

## 2022-03-23 PROCEDURE — 97014 ELECTRIC STIMULATION THERAPY: CPT

## 2022-03-23 PROCEDURE — 97140 MANUAL THERAPY 1/> REGIONS: CPT

## 2022-03-23 PROCEDURE — 97110 THERAPEUTIC EXERCISES: CPT

## 2022-03-23 NOTE — OP THERAPY DAILY TREATMENT
"  Outpatient Physical Therapy  DAILY TREATMENT     [facility] Therapy [address].  [address]  [place] [address]  Phone:  [phone]  Fax:  [phone]    Date: 03/23/2022    Patient: Diana Hurtado  YOB: 1970  MRN: [id number]     Time Calculation    Start time: 0200  Stop time: 0245 Time Calculation (min): 45 minutes         Chief Complaint: No chief complaint on file.    Visit #: 3    SUBJECTIVE:  Overall, better.  Less pain with bed mobility and transitional movements--still difficulty with sitting    OBJECTIVE:L ASIS high          Therapeutic Treatments and Modalities:     Therapeutic Treatment and Modalities Summary: Met innominate  REIL with p/a w/ L rotation via ASIS  S/l ext rot HVLA bilateral// \" less 3/10 central    REIL with PT o/p with and without L hip flexion//  Core stab level I w/ BFB:   --ball in/out  Ball bridge x 20  ELDOA      Time-based treatments/modalities:    Physical Therapy Timed Treatment Charges  Manual therapy minutes (CPT 03868): 10 minutes  Therapeutic exercise minutes (CPT 55255): 30 minutes      Pain rating (1-10) before treatment:  5 l glut  Pain rating (1-10) after treatment:  2 \" much better in the middle  ASSESSMENT:   Cont. To present with L asis High w/ patient reporting centralization after treatment. Presents with limited paraspinal endurance but able to tolerate ex w/o perpperalization    PLAN/RECOMMENDATIONS:   , progress core stab and endurance, mobs/hvla, progress core stab and DN as indicated     "

## 2022-03-28 ENCOUNTER — PHYSICAL THERAPY (OUTPATIENT)
Dept: PHYSICAL THERAPY | Facility: REHABILITATION | Age: 52
End: 2022-03-28
Attending: PHYSICIAN ASSISTANT
Payer: COMMERCIAL

## 2022-03-28 DIAGNOSIS — M54.50 ACUTE LEFT-SIDED LOW BACK PAIN WITHOUT SCIATICA: ICD-10-CM

## 2022-03-28 PROCEDURE — 97110 THERAPEUTIC EXERCISES: CPT

## 2022-03-28 PROCEDURE — 97140 MANUAL THERAPY 1/> REGIONS: CPT

## 2022-03-28 PROCEDURE — 97012 MECHANICAL TRACTION THERAPY: CPT

## 2022-03-28 NOTE — OP THERAPY DAILY TREATMENT
"  Outpatient Physical Therapy  DAILY TREATMENT     [facility] Physical Therapy [address].  [address]  [place] [address]  Phone:  [phone]  Fax:  [phone]    Date: 03/28/2022    Patient: Diana Hurtado  YOB: 1970  MRN: [id number]     Time Calculation    Start time: 0719  Stop time: 0810 Time Calculation (min): 51 minutes         Chief Complaint: No chief complaint on file.    Visit #: 4    SUBJECTIVE:  Overall, better.  Less pain with bed mobility and transitional movements--still difficulty with sitting    OBJECTIVE:  L ASIS high          Therapeutic Treatments and Modalities:     Therapeutic Treatment and Modalities Summary: Met innominate--break stick--self correction  S/l ext rot HVLA bilateral// \" less 1/10 central    ELDOA--reviewed  Superman x 3 x 45-90 \"// better\"  Traction Mechanical traction  95/50 x 60/20 x 15' w/ mhp      Time-based treatments/modalities:    Physical Therapy Timed Treatment Charges  Manual therapy minutes (CPT 70108): 10 minutes  Therapeutic exercise minutes (CPT 09190): 15 minutes      Pain rating (1-10) before treatment:  4l glut  Pain rating (1-10) after treatment:   \" much better in the middle  ASSESSMENT:   Cont. To present with L asis High w/ patient reporting centralization after treatment. Tolerated ex. With decreased pain    PLAN/RECOMMENDATIONS:   , progress core stab and endurance, mobs/hvla, progress core stab, tx, DN as indicated     "

## 2022-03-31 ENCOUNTER — PHYSICAL THERAPY (OUTPATIENT)
Dept: PHYSICAL THERAPY | Facility: REHABILITATION | Age: 52
End: 2022-03-31
Attending: PHYSICIAN ASSISTANT
Payer: COMMERCIAL

## 2022-03-31 DIAGNOSIS — M54.50 ACUTE LEFT-SIDED LOW BACK PAIN WITHOUT SCIATICA: ICD-10-CM

## 2022-03-31 PROCEDURE — 97012 MECHANICAL TRACTION THERAPY: CPT

## 2022-03-31 PROCEDURE — 97140 MANUAL THERAPY 1/> REGIONS: CPT

## 2022-03-31 PROCEDURE — 97110 THERAPEUTIC EXERCISES: CPT

## 2022-03-31 NOTE — OP THERAPY DAILY TREATMENT
"  Outpatient Physical Therapy  DAILY TREATMENT     [facility] Physical Therapy [address].  [address]  [place] [address]  Phone:  [phone]  Fax:  [phone]    Date: 03/31/2022    Patient: Diana Hurtado  YOB: 1970  MRN: [id number]     Time Calculation    Start time: 0845  Stop time: 0945 Time Calculation (min): 60 minutes         Chief Complaint: No chief complaint on file.    Visit #: 5    SUBJECTIVE:  Much better and able to walk faster and longer    OBJECTIV with more centralizedE:  L ASIS high          Therapeutic Treatments and Modalities:     Therapeutic Treatment and Modalities Summary: Reil w/ PT o/p  S/l ext rot HVLA bilateral// centralized and less  Ball bridge x 1'30\" x x3  ELDOA--reviewed  Traction Mechanical traction  95/50 x 60/20 x 15' w/ mhp      Time-based treatments/modalities:    Physical Therapy Timed Treatment Charges  Manual therapy minutes (CPT 53027): 20 minutes  Therapeutic exercise minutes (CPT 65192): 20 minutes      Pain rating (1-10) before treatment: 1/10 cental   Pain rating (1-10) after treatment:   \"almost no pain  ASSESSMENT:   Pelvic symmetry with central pain today.    PLAN/RECOMMENDATIONS:   , progress core stab and endurance, mobs/hvla, progress core stab, tx, DN as indicated     "

## 2022-04-08 ENCOUNTER — APPOINTMENT (OUTPATIENT)
Dept: PHYSICAL THERAPY | Facility: REHABILITATION | Age: 52
End: 2022-04-08
Payer: COMMERCIAL

## 2022-04-08 ENCOUNTER — PHYSICAL THERAPY (OUTPATIENT)
Dept: PHYSICAL THERAPY | Facility: REHABILITATION | Age: 52
End: 2022-04-08
Attending: PHYSICIAN ASSISTANT
Payer: COMMERCIAL

## 2022-04-08 DIAGNOSIS — M54.50 ACUTE LEFT-SIDED LOW BACK PAIN WITHOUT SCIATICA: ICD-10-CM

## 2022-04-08 PROCEDURE — 97012 MECHANICAL TRACTION THERAPY: CPT

## 2022-04-08 PROCEDURE — 97110 THERAPEUTIC EXERCISES: CPT

## 2022-04-08 PROCEDURE — 97140 MANUAL THERAPY 1/> REGIONS: CPT

## 2022-04-08 NOTE — OP THERAPY DAILY TREATMENT
"  Outpatient Physical Therapy  DAILY TREATMENT     [facility] Physical Therapy [address].  [address]  [place] [address]  Phone:  [phone]  Fax:  [phone]    Date: 04/08/2022    Patient: Diana Hurtado  YOB: 1970  MRN: [id number]     Time Calculation    Start time: 0722  Stop time: 0820 Time Calculation (min): 58 minutes         Chief Complaint: No chief complaint on file.    Visit #: 6    SUBJECTIVE:  Felt good until Tuesday and slightly less soreness over the past few days--pain has remained centralized    OBJECTIV with more centralizedE:  L ASIS high          Therapeutic Treatments and Modalities:     Therapeutic Treatment and Modalities Summary: MET self correction--> MRE/MET  Pelvic stab on roller  Reil w/ PT o/p--progression to belt o/p// no pain  Standing obliques #3 band with squats  Alternating straight arm multifidi recruitment in doorway--hep  Traction Mechanical traction  95/50 x 60/20 x 15' w/ mhp      Time-based treatments/modalities:    Physical Therapy Timed Treatment Charges  Manual therapy minutes (CPT 81833): 10 minutes  Therapeutic exercise minutes (CPT 10805): 30 minutes      Pain rating (1-10) before treatment: 2-3/10 central/slight L   Pain rating (1-10) after treatment:   \"almost no pain  ASSESSMENT:   Slight pelvic asymmetry.  Patient reported abolition of sx with mkenzie progression    PLAN/RECOMMENDATIONS:   , progress core stab and endurance, mobs/hvla, progress core stab, tx, DN as indicated     "

## 2022-04-12 ENCOUNTER — PHYSICAL THERAPY (OUTPATIENT)
Dept: PHYSICAL THERAPY | Facility: REHABILITATION | Age: 52
End: 2022-04-12
Attending: PHYSICIAN ASSISTANT
Payer: COMMERCIAL

## 2022-04-12 DIAGNOSIS — M54.50 ACUTE LEFT-SIDED LOW BACK PAIN WITHOUT SCIATICA: ICD-10-CM

## 2022-04-12 PROCEDURE — 97110 THERAPEUTIC EXERCISES: CPT

## 2022-04-12 PROCEDURE — 97140 MANUAL THERAPY 1/> REGIONS: CPT

## 2022-04-12 PROCEDURE — 97012 MECHANICAL TRACTION THERAPY: CPT

## 2022-04-12 NOTE — OP THERAPY DAILY TREATMENT
"  Outpatient Physical Therapy  DAILY TREATMENT     [facility] Physical Therapy [address]  [address]  [place] [address]  Phone:  [phone]  Fax:  [phone]    Date: 04/12/2022    Patient: Diana Hurtado  YOB: 1970  MRN: [id number]     Time Calculation    Start time: 0116  Stop time: 0215 Time Calculation (min): 59 minutes         Chief Complaint: No chief complaint on file.    Visit #: 7    SUBJECTIVE:  Patient reports that she has been more sore past 3 days    OBJECTIV with more centralizedE:  L ASIS high          Therapeutic Treatments and Modalities:     Therapeutic Treatment and Modalities Summary: REIL with PT o/p // 5/10  HVLA bilateral l/s  Sacral float  Supine active hamstring w/ neural glides  Standing obliques #3 band with squats--reviewed  Seated slr sahrman back stab w/ neural glides  Standing slump neural glides--hep  Traction Mechanical traction  95/40 x 60/20 x 15' w/ mhp      Time-based treatments/modalities:    Physical Therapy Timed Treatment Charges  Manual therapy minutes (CPT 42990): 30 minutes  Therapeutic exercise minutes (CPT 32369): 10 minutes      Pain rating (1-10) before treatment: 810 central coccyx   Pain rating (1-10) after treatment:   \"better the middle\" 3/10        ASSESSMENT:   Centralized pian with neural tension with patient reporting decreased pain after treatment    PLAN/RECOMMENDATIONS:   Neural glides, progress core stab and endurance, mobs/hvla, progress core stab, tx, DN as indicated     "

## 2022-04-14 ENCOUNTER — PHYSICAL THERAPY (OUTPATIENT)
Dept: PHYSICAL THERAPY | Facility: REHABILITATION | Age: 52
End: 2022-04-14
Attending: PHYSICIAN ASSISTANT
Payer: COMMERCIAL

## 2022-04-14 DIAGNOSIS — M54.50 ACUTE LEFT-SIDED LOW BACK PAIN WITHOUT SCIATICA: ICD-10-CM

## 2022-04-14 PROCEDURE — 97110 THERAPEUTIC EXERCISES: CPT

## 2022-04-14 PROCEDURE — 97140 MANUAL THERAPY 1/> REGIONS: CPT

## 2022-04-14 PROCEDURE — 97014 ELECTRIC STIMULATION THERAPY: CPT

## 2022-04-14 NOTE — OP THERAPY DAILY TREATMENT
"  Outpatient Physical Therapy  DAILY TREATMENT     [facility] Physical Therapy [address]  [address]  [place] [address]  Phone:  [phone]  Fax:  [phone]    Date: 04/14/2022    Patient: Diana Hurtado  YOB: 1970  MRN: [id number]     Time Calculation    Start time: 0805  Stop time: 0920 Time Calculation (min): 75 minutes         Chief Complaint: No chief complaint on file.    Visit #: 8    SUBJECTIVE:  Patient reports that she has been more sore past 3 days    OBJECTIV with more centralizedE:  L ASIS high          Therapeutic Treatments and Modalities:     Therapeutic Treatment and Modalities Summary: seil with L hip flexion and straight  SEIL with p/a l5 // centralized to coccyx   Supine active hamstring w/ neural glides  Ball bridge  X 1' x 2//\" less pain while holding position\"  Ball roll with bfb 90/90 endurance   Standing obliques #3 band with squats--reviewed  Seated slr sahrman back stab w/ neural glides  Standing slump neural glides--hep  DN: Patient signed informed written release and verbally agreed with informed consent to procedure of dry needling   skin prep with isopropyl  Alcohol/Chlora prep  -bilateral t/S multifidis t10-l1,  -TENS w/ FDN    -MHP x 10'  -No adverse reactions observed post treatment  Tape l/s for bfb        Time-based treatments/modalities:    Physical Therapy Timed Treatment Charges  Manual therapy minutes (CPT 09254): 10 minutes  Therapeutic exercise minutes (CPT 31806): 30 minutes    Pain rating (1-10) before treatment: 6/10 central coccyx   Pain rating (1-10) after treatment:   No pain    ASSESSMENT:   Centralized  with dennis progression and core stab with focus on hip dissociation    PLAN/RECOMMENDATIONS:   Neural glides, progress core stab and endurance, mobs/hvla, progress core stab, tx, DN as indicated     "

## 2022-04-20 ENCOUNTER — PHYSICAL THERAPY (OUTPATIENT)
Dept: PHYSICAL THERAPY | Facility: REHABILITATION | Age: 52
End: 2022-04-20
Attending: PHYSICIAN ASSISTANT
Payer: COMMERCIAL

## 2022-04-20 DIAGNOSIS — M54.50 ACUTE LEFT-SIDED LOW BACK PAIN WITHOUT SCIATICA: ICD-10-CM

## 2022-04-20 PROCEDURE — 97012 MECHANICAL TRACTION THERAPY: CPT

## 2022-04-20 PROCEDURE — 97014 ELECTRIC STIMULATION THERAPY: CPT

## 2022-04-20 NOTE — OP THERAPY DAILY TREATMENT
"  Outpatient Physical Therapy  DAILY TREATMENT     [facility] Physical Therapy [address].  [address]  [place] [address]  Phone:  [phone]  Fax:  [phone]    Date: 04/20/2022    Patient: Diana Hurtado  YOB: 1970  MRN: [id number]     Time Calculation                   Chief Complaint: No chief complaint on file.    Visit #: 9    SUBJECTIVE:      OBJECTIV with more centralizedE:  L ASIS --level          Therapeutic Treatments and Modalities:     Therapeutic Treatment and Modalities Summary: supermans x 2' - mini ski jumper position  Supine with towel roll--sleeping position  SEIL with p/a l5 // centralized to coccyx   Bilateral HVLA lower l/s//\" more in the central\"    Standing obliques #3 band with squats--reviewed  Seated slr sahrman back stab w/ neural glides--reviewed  Standing slump neural glides--hep--reviewed  DN: Patient signed informed written release and verbally agreed with informed consent to procedure of dry needling   skin prep with isopropyl  Alcohol  -bilateral l/s l3-s2  -TENS w/ FDN    -MHP x 10'  -No adverse reactions observed post treatment  Mechanical traction  90/40 x 60/20 x 15' w/ mhp        Time-based treatments/modalities:         Pain rating (1-10) before treatment: 3/10 central coccyx   Pain rating (1-10) after treatment:   1/10 less and still in the middle    ASSESSMENT:   Centralized  with dennis progression with fair- hep compliance but good spinal endurance.  Still struggles with posture awareness.  Cont to present with axial and flexion sensitivities but also static positions at night limiting sleep tolerance. Patient cont. To struggle with body awareness     PLAN/RECOMMENDATIONS:   Neural glides, progress core stab and endurance, mobs/hvla, progress core stab, tx, DN as indicated     "

## 2022-04-22 ENCOUNTER — PHYSICAL THERAPY (OUTPATIENT)
Dept: PHYSICAL THERAPY | Facility: REHABILITATION | Age: 52
End: 2022-04-22
Attending: PHYSICIAN ASSISTANT
Payer: COMMERCIAL

## 2022-04-22 DIAGNOSIS — M54.50 ACUTE LEFT-SIDED LOW BACK PAIN WITHOUT SCIATICA: ICD-10-CM

## 2022-04-22 PROCEDURE — 97110 THERAPEUTIC EXERCISES: CPT

## 2022-04-22 PROCEDURE — 97012 MECHANICAL TRACTION THERAPY: CPT

## 2022-04-22 PROCEDURE — 97140 MANUAL THERAPY 1/> REGIONS: CPT

## 2022-04-22 NOTE — OP THERAPY DAILY TREATMENT
Outpatient Physical Therapy  DAILY TREATMENT     [facility] Physical Therapy [address].  [address]  [place] [address]  Phone:  [phone]  Fax:  [phone]    Date: 04/22/2022    Patient: Diana Hurtado  YOB: 1970  MRN: [id number]     Time Calculation                   Chief Complaint: No chief complaint on file.    Visit #: 10    SUBJECTIVE:  A lot better but still having pain at night    OBJECTIV with more centralizedE:  L ASIS --high          Therapeutic Treatments and Modalities:     Therapeutic Treatment and Modalities Summary: MET to pelvis--L asis high  Sacral float   Ball bridge x 1' x 2  Discussed sleeping body pillow    DN: Patient signed informed written release and verbally agreed with informed consent to procedure of dry needling   skin prep with isopropyl  Alcohol  -bilateral l/s l3-s2, blatteral SIJ, bilateral g med insertion  -TENS w/ FDN    -No adverse reactions observed post treatment  Mechanical traction  90/40 x 60/20 x 15' w/ mhp        Time-based treatments/modalities:    Physical Therapy Timed Treatment Charges  Manual therapy minutes (CPT 44548): 10 minutes  Therapeutic exercise minutes (CPT 21572): 30 minutes    Pain rating (1-10) before treatment: 1/10 central coccyx   Pain rating (1-10) after treatment:   1/10 less and still in the middle    ASSESSMENT:   Noted pelvic obliquity and and to centralized after treatment--fair core endurance with cont. Limits with body mechanics--discussed need to start wearing SI belt when up and walking for long durations    PLAN/RECOMMENDATIONS:   Neural glides, progress core stab and endurance, mobs/hvla, progress core stab, tx, DN as indicated

## 2022-04-26 ENCOUNTER — APPOINTMENT (OUTPATIENT)
Dept: PHYSICAL THERAPY | Facility: REHABILITATION | Age: 52
End: 2022-04-26
Attending: PHYSICIAN ASSISTANT
Payer: COMMERCIAL

## 2022-05-04 ENCOUNTER — PHYSICAL THERAPY (OUTPATIENT)
Dept: PHYSICAL THERAPY | Facility: REHABILITATION | Age: 52
End: 2022-05-04
Attending: PHYSICIAN ASSISTANT
Payer: COMMERCIAL

## 2022-05-04 DIAGNOSIS — M54.50 ACUTE LEFT-SIDED LOW BACK PAIN WITHOUT SCIATICA: ICD-10-CM

## 2022-05-04 PROCEDURE — 97140 MANUAL THERAPY 1/> REGIONS: CPT

## 2022-05-04 PROCEDURE — 97110 THERAPEUTIC EXERCISES: CPT

## 2022-05-04 PROCEDURE — 97014 ELECTRIC STIMULATION THERAPY: CPT

## 2022-05-04 NOTE — OP THERAPY DAILY TREATMENT
"  Outpatient Physical Therapy  DAILY TREATMENT     [facility] Physical Therapy [address].  [address]  [place] [address]  Phone:  [phone]  Fax:  [phone]    Date: 05/04/2022    Patient: Diana Hurtado  YOB: 1970  MRN: [id number]     Time Calculation    Start time: 0247  Stop time: 0400 Time Calculation (min): 73 minutes         Chief Complaint: No chief complaint on file.    Visit #: 11    SUBJECTIVE:  Worse past few days--stepped out of bed a few days ago and leg gave way.  overal much better still having flare ups    OBJECTIV with more centralizedE:  L ASIS --high          Therapeutic Treatments and Modalities:     Therapeutic Treatment and Modalities Summary: MET to pelvis--L asis high// ambulated with SI belt and reported abolition of sx  REIL with PT o/p with and without p/s l4-5  gd 4 at end range  Ball bridge x 2' x 2   reviewed importance of progressing endurance for paraspinals  SEIL with belt o/p with and without L hip flexion and russian 5/5 mhp x22'  kinesio tape  For biofeedback to l/s            Time-based treatments/modalities:    Physical Therapy Timed Treatment Charges  Manual therapy minutes (CPT 42688): 20 minutes  Therapeutic exercise minutes (CPT 21863): 25 minutes    Pain rating (1-10) before treatment: 4/10 central coccyx   Pain rating (1-10) after treatment:   1/10 less slightly there\"    ASSESSMENT:   Patient centralized with o/p and dennis extension progression after MET correction of pelvis.  Appears to present with hypermobile pelvis and should respond to constant use of SI belt when up    PLAN/RECOMMENDATIONS:   dennis progression,  progress core stab and endurance, mobs/hvla, progress core stab, tx, DN as indicated     "

## 2022-05-06 ENCOUNTER — PHYSICAL THERAPY (OUTPATIENT)
Dept: PHYSICAL THERAPY | Facility: REHABILITATION | Age: 52
End: 2022-05-06
Attending: PHYSICIAN ASSISTANT
Payer: COMMERCIAL

## 2022-05-06 DIAGNOSIS — M54.50 ACUTE LEFT-SIDED LOW BACK PAIN WITHOUT SCIATICA: ICD-10-CM

## 2022-05-06 PROCEDURE — 97140 MANUAL THERAPY 1/> REGIONS: CPT

## 2022-05-06 PROCEDURE — 97014 ELECTRIC STIMULATION THERAPY: CPT

## 2022-05-06 NOTE — OP THERAPY DAILY TREATMENT
"   Outpatient Physical Therapy  DAILY TREATMENT     [facility] Physical Therapy [address].  [address]  [place] [address]  Phone:  [phone]  Fax:  [phone]    Date: 05/06/2022    Patient: Diana Hurtado  YOB: 1970  MRN: [id number]     Time Calculation    Start time: 0847  Stop time: 0938 Time Calculation (min): 51 minutes         Chief Complaint: No chief complaint on file.    Visit #: 12    SUBJECTIVE:  Pretty good past few days    OBJECTIV with more centralizedE:  L ASIS --high slightly          Therapeutic Treatments and Modalities:     Therapeutic Treatment and Modalities Summary: MET to pelvis--L asis high// ambulated with SI belt and reported abolition of sx  DN: Patient signed informed written release and verbally agreed with informed consent to procedure of dry needling   skin prep with isopropyl  Alcohol  -bilateral C/S multifidis s1-3,  -TENS w/ FDN w/ varying hz  -MHP x 10'  -No adverse reactions observed post treatment  SEIL with belt o/p with and without L hip flexion and russian to sacral plateau 5/5 mhp x15'  bll bridge x 1'   kinesio tape  For biofeedback to l/s            Time-based treatments/modalities:    Physical Therapy Timed Treatment Charges  Manual therapy minutes (CPT 55695): 25 minutes    Pain rating (1-10) before treatment: 1/10 r tailbone   Pain rating (1-10) after treatment:   0/10\" stiff, but non-specific from position  ASSESSMENT:   Decreased pain since last visit with only minor pelvic obliquity-- mild ttp sacral mulitfidi    PLAN/RECOMMENDATIONS:   dennis progression,  progress core stab and endurance, mobs/hvla, progress core stab, tx, DN as indicated   d/c 2 visits  "

## 2022-05-11 ENCOUNTER — APPOINTMENT (OUTPATIENT)
Dept: PHYSICAL THERAPY | Facility: REHABILITATION | Age: 52
End: 2022-05-11
Attending: PHYSICIAN ASSISTANT
Payer: COMMERCIAL

## 2022-05-18 ENCOUNTER — PHYSICAL THERAPY (OUTPATIENT)
Dept: PHYSICAL THERAPY | Facility: REHABILITATION | Age: 52
End: 2022-05-18
Attending: PHYSICIAN ASSISTANT
Payer: COMMERCIAL

## 2022-05-18 DIAGNOSIS — M54.50 ACUTE LEFT-SIDED LOW BACK PAIN WITHOUT SCIATICA: ICD-10-CM

## 2022-05-18 PROCEDURE — 97140 MANUAL THERAPY 1/> REGIONS: CPT

## 2022-05-18 PROCEDURE — 97014 ELECTRIC STIMULATION THERAPY: CPT

## 2022-05-18 PROCEDURE — 97110 THERAPEUTIC EXERCISES: CPT

## 2022-05-18 NOTE — OP THERAPY DAILY TREATMENT
"   Outpatient Physical Therapy  DAILY TREATMENT     [facility] Physical Therapy [address].  [address]  [place] [address]  Phone:  [phone]  Fax:  [phone]    Date: 05/18/2022    Patient: Diana Hurtado  YOB: 1970  MRN: [id number]     Time Calculation    Start time: 0804  Stop time: 0918 Time Calculation (min): 74 minutes         Chief Complaint: No chief complaint on file.    Visit #: 13    SUBJECTIVE:  Pretty good for a week but then glut pain returned on L and patient can feel that her pelvis is out\"    OBJECTIV with more centralized:  L ASIS --high slightly  L sacral rotation          Therapeutic Treatments and Modalities:     Therapeutic Treatment and Modalities Summary: MET to inominates and sacrum--L asis high/ l sacral rotation   DN: Patient signed informed written release and verbally agreed with informed consent to procedure of dry needling   skin prep with isopropyl  Alcohol  -bilateral L/S multifidis l3-s2  -TENS w/ FDN w/ varying hz  -MHP x 10'  -No adverse reactions observed post treatment  SEIL with belt o/p with and without L hip flexion and russian to sacral plateau 5/5 mhp x15'  bll bridge x 1'   kinesio tape  For biofeedback to l/s            Time-based treatments/modalities:    Physical Therapy Timed Treatment Charges  Manual therapy minutes (CPT 95518): 25 minutes  Therapeutic exercise minutes (CPT 05751): 15 minutes    Pain rating (1-10) before treatment: 5/10 L glut  Pain rating (1-10) after treatment:   0/10\" stiff, but non-specific from position  ASSESSMENT:   Felt great for a week but then pain returned--pt. reports that she did n  PLAN/RECOMMENDATIONS:   dennis progression,  progress core stab and endurance, mobs/hvla, progress core stab, tx, DN as indicated   d/c 2 visits  "

## 2022-06-10 ENCOUNTER — PHYSICAL THERAPY (OUTPATIENT)
Dept: PHYSICAL THERAPY | Facility: REHABILITATION | Age: 52
End: 2022-06-10
Attending: NURSE PRACTITIONER
Payer: COMMERCIAL

## 2022-06-10 DIAGNOSIS — M54.50 ACUTE LEFT-SIDED LOW BACK PAIN WITHOUT SCIATICA: ICD-10-CM

## 2022-06-10 PROCEDURE — 97110 THERAPEUTIC EXERCISES: CPT

## 2022-06-10 NOTE — OP THERAPY DAILY TREATMENT
Outpatient Physical Therapy  DAILY TREATMENT     [facility] Physical Therapy [address]  [address]  [place] [address]  Phone:  [phone]  Fax:  [phone]    Date: 06/10/2022    Patient: Diana Hurtado  YOB: 1970  MRN: [id number]     Time Calculation    Start time: 0330  Stop time: 0420 Time Calculation (min): 50 minutes         Chief Complaint: No chief complaint on file.    Visit #: 14    SUBJECTIVE:  Significantly improved     OBJECTIV with more centralized:  L ASIS --high slightly  L sacral rotation        Therapeutic Treatments and Modalities:     Therapeutic Treatment and Modalities Summary: MET to inominates and sacrum--L asis high/ l sacral rotation   Tall wall with gh flexion to end range-hold x 30 with dep breathing and focus on elongation  Sacral float  Tall wall with gh flexion to end range-hold x 30 with dep breathing and focus on elongation          Time-based treatments/modalities:    Physical Therapy Timed Treatment Charges  Manual therapy minutes (CPT 63731): 10 minutes  Therapeutic exercise minutes (CPT 98726): 20 minutes    Pain rating (1-10) before treatment: no pain  Pain rating (1-10) after treatment:   0/10    ASSESSMENT:   Felt great for past few weeks with occasional pain that worsens with sitting--slight pelvic obliquity with   PLAN/RECOMMENDATIONS:   dennis progression,  progress core stab and endurance, mobs/hvla, progress core stab, tx, DN as indicated   d/c1  Visit--hold 3-4 weeks

## 2022-07-07 ENCOUNTER — PHYSICAL THERAPY (OUTPATIENT)
Dept: PHYSICAL THERAPY | Facility: REHABILITATION | Age: 52
End: 2022-07-07
Attending: NURSE PRACTITIONER
Payer: COMMERCIAL

## 2022-07-07 DIAGNOSIS — M54.50 ACUTE LEFT-SIDED LOW BACK PAIN WITHOUT SCIATICA: ICD-10-CM

## 2022-07-07 PROCEDURE — 97140 MANUAL THERAPY 1/> REGIONS: CPT

## 2022-07-07 PROCEDURE — 97014 ELECTRIC STIMULATION THERAPY: CPT

## 2022-07-07 NOTE — OP THERAPY DAILY TREATMENT
"   Outpatient Physical Therapy  DAILY TREATMENT     [facility] Physical Therapy [address].  [address]  [place] [address]  Phone:  [phone]  Fax:  [phone]    Date: 07/07/2022    Patient: Diana Hurtado  YOB: 1970  MRN: [id number]     Time Calculation    Start time: 0345  Stop time: 0430 Time Calculation (min): 45 minutes         Chief Complaint: No chief complaint on file.    Visit #: 15    SUBJECTIVE:  Doing well but [facility]    OBJECTIV with more centralized:  L ASIS --high slightly          Therapeutic Treatments and Modalities:     Therapeutic Treatment and Modalities Summary: MET to inominates and sacrum--L asis high/ l sacral rotation   l5-s1 ext rot mobs with ASIS moment arm gd 4  HVLA: ext/rot--bilateral \" significantly less pain  STM l/s -> with L TP gd 4 mobs with patient in 30 deg ext.  SEIL with [place]an 5/5 x 15'          Time-based treatments/modalities:    Physical Therapy Timed Treatment Charges  Manual therapy minutes (CPT 48877): 30 minutes    Pain rating (1-10) before treatment: ache 2-3 L side  Pain rating (1-10) after treatment:   0/10    ASSESSMENT:   Felt great for past few weeks with occasional pain and some discomfort over the past few nights with patient suspecting that her pelvis is off but did not self correct--noted relief and decreased pain after manual treatment  Recommend patient wear belt when up  PLAN/RECOMMENDATIONS:   dennis hinojosa,  progress core stab and endurance, mobs/hvla, progress core stab, tx, DN as indicated   d/c1  Visit  "

## 2022-08-10 NOTE — OP THERAPY DAILY TREATMENT
"  Outpatient Physical Therapy  DAILY TREATMENT     [facility] Physical Therapy [address].  [address]  [place] [address]  Phone:  [phone]  Fax:  [phone]    Date: 08/05/2021    Patient: Diana Hurtado  YOB: 1970  MRN: [id number]     Time Calculation    Start time: 0940  Stop time: 1100 Time Calculation (min): 80 minutes         Chief Complaint: No chief complaint on file.    Visit #: 7    SUBJECTIVE:   minimal pain with standing but limits with sitting any lying position  \" good relief after last visit but not lasting\"  OBJECTIVE:   asis level          Therapeutic Treatments and Modalities:     Therapeutic Treatment and Modalities Summary: REIL with belt o/p  ERICH with p/a mobs l4-5 gd 4  Sacral float  initiated planks x 4 direction 10-20\" holds  Ball bridge x 35\"// slight leg pain but resolved OOP--\" no pain\" in standing after ex  Ball roll with BFB  Ball roll with russian to l/s and bfb x 5'// focus on hip dissociation  instructed self o/p with belt  Mechanical traction --prone-- 90/50 x 60/20 x 15' w/ mhp    Taped l/s  instructed vertical towel in car    Reviewed importance of body mechanics and recruitment mulitfidi from bottom up in sitting  Tape l/s for BFB    Time-based treatments/modalities:    Physical Therapy Timed Treatment Charges  Manual therapy minutes (CPT 26229): 30 minutes  Therapeutic exercise minutes (CPT 74901): 15 minutes      Pain rating (1-10) before treatment:  2/10 back L gluts--SI  Pain rating (1-10) after treatment:  Better,-middle back   ASSESSMENT:   Patient continued  To centralized with treatment .  Noted cont.  pelvic and sacral obliquity with severe palpatory tenderness to L sacral base--repsponded better with dennis progression with o/p    PLAN/RECOMMENDATIONS:core stab,  manual treatment, traction and DN as indicated, assess pelvis       " Right arm;

## 2022-08-11 ENCOUNTER — PHYSICAL THERAPY (OUTPATIENT)
Dept: PHYSICAL THERAPY | Facility: REHABILITATION | Age: 52
End: 2022-08-11
Attending: NURSE PRACTITIONER
Payer: COMMERCIAL

## 2022-08-11 DIAGNOSIS — M54.50 ACUTE LEFT-SIDED LOW BACK PAIN WITHOUT SCIATICA: ICD-10-CM

## 2022-08-11 PROCEDURE — 97140 MANUAL THERAPY 1/> REGIONS: CPT

## 2022-08-11 PROCEDURE — 97012 MECHANICAL TRACTION THERAPY: CPT

## 2022-08-11 NOTE — OP THERAPY DAILY TREATMENT
"   Outpatient Physical Therapy  DAILY TREATMENT     [facility] Physical Therapy [address].  [address]  [place] [address]  Phone:  [phone]  Fax:  [phone]    Date: 08/11/2022    Patient: Diana Hurtado  YOB: 1970  MRN: [id number]     Time Calculation                   Chief Complaint: No chief complaint on file.    Visit #: 16    SUBJECTIVE:  Doing well but has been uncomfortable at n ight past few nights    OBJECTIV with more centralized:            Therapeutic Treatments and Modalities:     Therapeutic Treatment and Modalities Summary: SEIL with p/a l5    P/a l5 in ext  gd 4// centralized  l5-s1 ext rot mobs with ASIS moment arm gd 4  HVLA: ext/rot--bilateral \" significantly less pain  STM l/s -> with L TP gd 4 mobs with patient in 30 deg ext.  SEIL with Bahraini 5/5 x 15'        Time-based treatments/modalities:         Pain rating (1-10) before treatment:8/10 coccyx  After pain: 1/10  bp only    ASSESSMENT:   Presented with l/s derangement and centralized with ext protocol and manipulations  PLAN/RECOMMENDATIONS:   dennis progression,  progress core stab and endurance, mobs/hvla, progress core stab, tx, DN as indicated   d/c1  Visit  "

## 2022-09-16 ENCOUNTER — OFFICE VISIT (OUTPATIENT)
Dept: MEDICAL GROUP | Facility: LAB | Age: 52
End: 2022-09-16
Payer: COMMERCIAL

## 2022-09-16 VITALS
OXYGEN SATURATION: 96 % | BODY MASS INDEX: 32.58 KG/M2 | HEIGHT: 68 IN | SYSTOLIC BLOOD PRESSURE: 104 MMHG | DIASTOLIC BLOOD PRESSURE: 76 MMHG | TEMPERATURE: 98 F | WEIGHT: 215 LBS | RESPIRATION RATE: 16 BRPM | HEART RATE: 72 BPM

## 2022-09-16 DIAGNOSIS — Z13.29 SCREENING FOR THYROID DISORDER: ICD-10-CM

## 2022-09-16 DIAGNOSIS — Z12.11 COLON CANCER SCREENING: ICD-10-CM

## 2022-09-16 DIAGNOSIS — Z00.00 WELLNESS EXAMINATION: ICD-10-CM

## 2022-09-16 DIAGNOSIS — Z23 NEED FOR VACCINATION: ICD-10-CM

## 2022-09-16 DIAGNOSIS — E78.5 DYSLIPIDEMIA: ICD-10-CM

## 2022-09-16 PROBLEM — B02.9 HERPES ZOSTER WITHOUT COMPLICATION: Status: ACTIVE | Noted: 2022-09-16

## 2022-09-16 PROBLEM — U07.1 COVID-19: Status: ACTIVE | Noted: 2022-09-16

## 2022-09-16 PROBLEM — B02.9 HERPES ZOSTER WITHOUT COMPLICATION: Status: RESOLVED | Noted: 2022-09-16 | Resolved: 2022-09-16

## 2022-09-16 PROBLEM — U07.1 COVID-19: Status: RESOLVED | Noted: 2022-09-16 | Resolved: 2022-09-16

## 2022-09-16 PROCEDURE — 90471 IMMUNIZATION ADMIN: CPT | Performed by: PHYSICIAN ASSISTANT

## 2022-09-16 PROCEDURE — 90686 IIV4 VACC NO PRSV 0.5 ML IM: CPT | Performed by: PHYSICIAN ASSISTANT

## 2022-09-16 PROCEDURE — 90715 TDAP VACCINE 7 YRS/> IM: CPT | Performed by: PHYSICIAN ASSISTANT

## 2022-09-16 PROCEDURE — 90472 IMMUNIZATION ADMIN EACH ADD: CPT | Performed by: PHYSICIAN ASSISTANT

## 2022-09-16 PROCEDURE — 99396 PREV VISIT EST AGE 40-64: CPT | Mod: 25 | Performed by: PHYSICIAN ASSISTANT

## 2022-09-16 SDOH — ECONOMIC STABILITY: FOOD INSECURITY: WITHIN THE PAST 12 MONTHS, YOU WORRIED THAT YOUR FOOD WOULD RUN OUT BEFORE YOU GOT MONEY TO BUY MORE.: NEVER TRUE

## 2022-09-16 SDOH — ECONOMIC STABILITY: TRANSPORTATION INSECURITY
IN THE PAST 12 MONTHS, HAS LACK OF TRANSPORTATION KEPT YOU FROM MEETINGS, WORK, OR FROM GETTING THINGS NEEDED FOR DAILY LIVING?: NO

## 2022-09-16 SDOH — ECONOMIC STABILITY: HOUSING INSECURITY
IN THE LAST 12 MONTHS, WAS THERE A TIME WHEN YOU DID NOT HAVE A STEADY PLACE TO SLEEP OR SLEPT IN A SHELTER (INCLUDING NOW)?: NO

## 2022-09-16 SDOH — HEALTH STABILITY: PHYSICAL HEALTH: ON AVERAGE, HOW MANY DAYS PER WEEK DO YOU ENGAGE IN MODERATE TO STRENUOUS EXERCISE (LIKE A BRISK WALK)?: 2 DAYS

## 2022-09-16 SDOH — HEALTH STABILITY: PHYSICAL HEALTH: ON AVERAGE, HOW MANY MINUTES DO YOU ENGAGE IN EXERCISE AT THIS LEVEL?: 20 MIN

## 2022-09-16 SDOH — ECONOMIC STABILITY: INCOME INSECURITY: IN THE LAST 12 MONTHS, WAS THERE A TIME WHEN YOU WERE NOT ABLE TO PAY THE MORTGAGE OR RENT ON TIME?: NO

## 2022-09-16 SDOH — ECONOMIC STABILITY: FOOD INSECURITY: WITHIN THE PAST 12 MONTHS, THE FOOD YOU BOUGHT JUST DIDN'T LAST AND YOU DIDN'T HAVE MONEY TO GET MORE.: NEVER TRUE

## 2022-09-16 SDOH — ECONOMIC STABILITY: HOUSING INSECURITY: IN THE LAST 12 MONTHS, HOW MANY PLACES HAVE YOU LIVED?: 1

## 2022-09-16 SDOH — HEALTH STABILITY: MENTAL HEALTH
STRESS IS WHEN SOMEONE FEELS TENSE, NERVOUS, ANXIOUS, OR CAN'T SLEEP AT NIGHT BECAUSE THEIR MIND IS TROUBLED. HOW STRESSED ARE YOU?: TO SOME EXTENT

## 2022-09-16 SDOH — ECONOMIC STABILITY: TRANSPORTATION INSECURITY
IN THE PAST 12 MONTHS, HAS THE LACK OF TRANSPORTATION KEPT YOU FROM MEDICAL APPOINTMENTS OR FROM GETTING MEDICATIONS?: NO

## 2022-09-16 SDOH — ECONOMIC STABILITY: TRANSPORTATION INSECURITY
IN THE PAST 12 MONTHS, HAS LACK OF RELIABLE TRANSPORTATION KEPT YOU FROM MEDICAL APPOINTMENTS, MEETINGS, WORK OR FROM GETTING THINGS NEEDED FOR DAILY LIVING?: NO

## 2022-09-16 SDOH — ECONOMIC STABILITY: INCOME INSECURITY: HOW HARD IS IT FOR YOU TO PAY FOR THE VERY BASICS LIKE FOOD, HOUSING, MEDICAL CARE, AND HEATING?: NOT HARD AT ALL

## 2022-09-16 ASSESSMENT — PATIENT HEALTH QUESTIONNAIRE - PHQ9: CLINICAL INTERPRETATION OF PHQ2 SCORE: 0

## 2022-09-16 ASSESSMENT — SOCIAL DETERMINANTS OF HEALTH (SDOH)
HOW OFTEN DO YOU GET TOGETHER WITH FRIENDS OR RELATIVES?: TWICE A WEEK
HOW MANY DRINKS CONTAINING ALCOHOL DO YOU HAVE ON A TYPICAL DAY WHEN YOU ARE DRINKING: 1 OR 2
HOW OFTEN DO YOU HAVE A DRINK CONTAINING ALCOHOL: MONTHLY OR LESS
HOW OFTEN DO YOU ATTEND CHURCH OR RELIGIOUS SERVICES?: 1 TO 4 TIMES PER YEAR
IN A TYPICAL WEEK, HOW MANY TIMES DO YOU TALK ON THE PHONE WITH FAMILY, FRIENDS, OR NEIGHBORS?: MORE THAN THREE TIMES A WEEK
DO YOU BELONG TO ANY CLUBS OR ORGANIZATIONS SUCH AS CHURCH GROUPS UNIONS, FRATERNAL OR ATHLETIC GROUPS, OR SCHOOL GROUPS?: NO
DO YOU BELONG TO ANY CLUBS OR ORGANIZATIONS SUCH AS CHURCH GROUPS UNIONS, FRATERNAL OR ATHLETIC GROUPS, OR SCHOOL GROUPS?: NO
HOW HARD IS IT FOR YOU TO PAY FOR THE VERY BASICS LIKE FOOD, HOUSING, MEDICAL CARE, AND HEATING?: NOT HARD AT ALL
HOW OFTEN DO YOU GET TOGETHER WITH FRIENDS OR RELATIVES?: TWICE A WEEK
HOW OFTEN DO YOU HAVE SIX OR MORE DRINKS ON ONE OCCASION: NEVER
HOW OFTEN DO YOU ATTEND CHURCH OR RELIGIOUS SERVICES?: 1 TO 4 TIMES PER YEAR
WITHIN THE PAST 12 MONTHS, YOU WORRIED THAT YOUR FOOD WOULD RUN OUT BEFORE YOU GOT THE MONEY TO BUY MORE: NEVER TRUE
IN A TYPICAL WEEK, HOW MANY TIMES DO YOU TALK ON THE PHONE WITH FAMILY, FRIENDS, OR NEIGHBORS?: MORE THAN THREE TIMES A WEEK

## 2022-09-16 ASSESSMENT — LIFESTYLE VARIABLES
HOW OFTEN DO YOU HAVE A DRINK CONTAINING ALCOHOL: MONTHLY OR LESS
AUDIT-C TOTAL SCORE: 1
SKIP TO QUESTIONS 9-10: 1
HOW OFTEN DO YOU HAVE SIX OR MORE DRINKS ON ONE OCCASION: NEVER
HOW MANY STANDARD DRINKS CONTAINING ALCOHOL DO YOU HAVE ON A TYPICAL DAY: 1 OR 2

## 2022-09-16 ASSESSMENT — FIBROSIS 4 INDEX: FIB4 SCORE: 1.05

## 2022-09-16 NOTE — PROGRESS NOTES
"Subjective:     CC:   Chief Complaint   Patient presents with    Annual Exam       HPI:   Diana Hurtado is a 51 y.o. female who presents for annual exam. She is feeling well and denies any complaints.    \recent episode of shingles  Pap in Jan 2022  Covid dec/2021      Health Maintenance  Cholesterol Screening: ordered today   Diabetes Screening: ordered today   Exercise: \"could be better\"   Substance Abuse: denies   Safe in relationship.   Seat belts, bike helmet, gun safety discussed.  Sun protection used.    Cancer screening  Colorectal Cancer Screening: ordered today    Cervical Cancer Screening: UTD, follows with [facility]   Breast Cancer Screening: UTD     Infectious disease screening/Immunizations    --Immunizations:    Influenza: given today   Tetanus: ordered today    She  has a past medical history of Dyslipidemia (5/9/2013).  She  has a past surgical history that includes tmj implant removal.    Family History   Problem Relation Age of Onset    Arthritis Mother     Hypertension Father     Diabetes Father     Arthritis Father     Cancer Maternal Aunt     Cancer Sister        Social History     Socioeconomic History    Marital status:      Spouse name: Not on file    Number of children: Not on file    Years of education: Not on file    Highest education level: Master's degree (e.g., MA, MS, Suresh, MEd, MSW, AILIN)   Occupational History    Not on file   Tobacco Use    Smoking status: Never    Smokeless tobacco: Never   Substance and Sexual Activity    Alcohol use: Yes    Drug use: No    Sexual activity: Yes   Other Topics Concern    Not on file   Social History Narrative    Not on file     Social Determinants of Health     Financial Resource Strain: Low Risk     Difficulty of Paying Living Expenses: Not hard at all   Food Insecurity: No Food Insecurity    Worried About Running Out of Food in the Last Year: Never true    Ran Out of Food in the Last Year: Never true   Transportation " Needs: No Transportation Needs    Lack of Transportation (Medical): No    Lack of Transportation (Non-Medical): No   Physical Activity: Insufficiently Active    Days of Exercise per Week: 2 days    Minutes of Exercise per Session: 20 min   Stress: Stress Concern Present    Feeling of Stress : To some extent   Social Connections: Moderately Integrated    Frequency of Communication with Friends and Family: More than three times a week    Frequency of Social Gatherings with Friends and Family: Twice a week    Attends [employer] Services: 1 to 4 times per year    Active Member of Clubs or Organizations: No    Attends Club or Organization Meetings: Not on file    Marital Status:    Intimate Partner Violence: Not on file   Housing Stability: Low Risk     Unable to Pay for Housing in the Last Year: No    Number of Places Lived in the Last Year: 1    Unstable Housing in the Last Year: No       Patient Active Problem List    Diagnosis Date Noted    Dyslipidemia 05/09/2013         Current Outpatient Medications   Medication Sig Dispense Refill    levonorgestrel (MIRENA) 20 MCG/DAY IUD 1 Each by Intrauterine route one time.       No current facility-administered medications for this visit.     Allergies   Allergen Reactions    Latex Hives and Rash       Review of Systems   Constitutional: Negative for fever, chills and malaise/fatigue.   HENT: Negative for congestion.    Eyes: Negative for pain.    Respiratory: Negative for cough and shortness of breath.  Cardiovascular: Negative for leg swelling.   Gastrointestinal: Negative for nausea, vomiting, abdominal pain and diarrhea.   Genitourinary: Negative for dysuria and hematuria.   Skin: Negative for rash.   Neurological: Negative for dizziness, focal weakness and headaches.   Endo/Heme/Allergies: Does not bleed easily.   Psychiatric/Behavioral: Negative for depression.  The patient is not nervous/anxious.      Objective:     /76   Pulse 72   Temp 36.7 °C (98 °F)   " Resp 16   Ht 1.727 m (5' 8\")   Wt 97.5 kg (215 lb)   SpO2 96%   BMI 32.69 kg/m²   Body mass index is 32.69 kg/m².  Wt Readings from Last 4 Encounters:   09/16/22 97.5 kg (215 lb)   03/16/22 93 kg (205 lb)   01/17/22 98 kg (216 lb 0.8 oz)   10/23/13 87.5 kg (193 lb)       Physical Exam:  Constitutional: Well-developed and well-nourished. Not diaphoretic. No distress.   Skin: Skin is warm and dry. No rash noted.  Head: Atraumatic without lesions.  Eyes: Conjunctivae and extraocular motions are normal. Pupils are equal, round, and reactive to light. No scleral icterus.   Ears:  External ears unremarkable. Tympanic membranes clear and intact.  Nose: Nares patent. Septum midline. Turbinates without erythema nor edema. No discharge.   Mouth/Throat: Dentition is intact. Tongue normal. Oropharynx is clear and moist. Posterior pharynx without erythema or exudates.  Neck: Supple, trachea midline. Normal range of motion. No thyromegaly present. No lymphadenopathy--cervical or supraclavicular.  Cardiovascular: Regular rate and rhythm, S1 and S2 without murmur, rubs, or gallops.  Lungs: Normal inspiratory effort, CTA bilaterally, no wheezes/rhonchi/rales  Abdomen: Soft, non tender, and without distention. Active bowel sounds in all four quadrants. No rebound, guarding, masses or HSM.  Extremities: No cyanosis, clubbing, erythema, nor edema. Distal pulses intact and symmetric.   Musculoskeletal: All major joints AROM full in all directions without pain.  Neurological: Alert and oriented x 3. DTRs 2+/3 and symmetric. No cranial nerve deficit. 5/5 myotomes. Sensation intact.   Psychiatric:  Behavior, mood, and affect are appropriate.      Assessment and Plan:     1. Wellness examination        2. Dyslipidemia  CBC WITH DIFFERENTIAL    Comp Metabolic Panel    Lipid Profile      3. Need for vaccination  Tdap =>8yo IM    Influenza Vaccine Quad Injection (PF)      4. Colon cancer screening  Referral to GI for Colonoscopy      5. " Screening for thyroid disorder  TSH WITH REFLEX TO FT4          HCM:  routine anticipatory guidance  Labs per orders  Immunizations per orders  Patient counseled about skin care, diet, supplements, prenatal vitamins, safe sex and exercise.      Follow-up: Return in about 1 year (around 9/16/2023) for Annual Wellness.

## 2022-10-26 ENCOUNTER — HOSPITAL ENCOUNTER (OUTPATIENT)
Dept: LAB | Facility: MEDICAL CENTER | Age: 52
End: 2022-10-26
Attending: PHYSICIAN ASSISTANT
Payer: COMMERCIAL

## 2022-10-26 DIAGNOSIS — E78.5 DYSLIPIDEMIA: ICD-10-CM

## 2022-10-26 DIAGNOSIS — Z13.29 SCREENING FOR THYROID DISORDER: ICD-10-CM

## 2022-10-26 LAB
ALBUMIN SERPL BCP-MCNC: 4.4 G/DL (ref 3.2–4.9)
ALBUMIN/GLOB SERPL: 1.6 G/DL
ALP SERPL-CCNC: 86 U/L (ref 30–99)
ALT SERPL-CCNC: 18 U/L (ref 2–50)
ANION GAP SERPL CALC-SCNC: 11 MMOL/L (ref 7–16)
AST SERPL-CCNC: 17 U/L (ref 12–45)
BASOPHILS # BLD AUTO: 1.1 % (ref 0–1.8)
BASOPHILS # BLD: 0.09 K/UL (ref 0–0.12)
BILIRUB SERPL-MCNC: 0.6 MG/DL (ref 0.1–1.5)
BUN SERPL-MCNC: 13 MG/DL (ref 8–22)
CALCIUM SERPL-MCNC: 9.2 MG/DL (ref 8.5–10.5)
CHLORIDE SERPL-SCNC: 105 MMOL/L (ref 96–112)
CHOLEST SERPL-MCNC: 200 MG/DL (ref 100–199)
CO2 SERPL-SCNC: 26 MMOL/L (ref 20–33)
CREAT SERPL-MCNC: 0.82 MG/DL (ref 0.5–1.4)
EOSINOPHIL # BLD AUTO: 0.21 K/UL (ref 0–0.51)
EOSINOPHIL NFR BLD: 2.5 % (ref 0–6.9)
ERYTHROCYTE [DISTWIDTH] IN BLOOD BY AUTOMATED COUNT: 40.9 FL (ref 35.9–50)
GFR SERPLBLD CREATININE-BSD FMLA CKD-EPI: 87 ML/MIN/1.73 M 2
GLOBULIN SER CALC-MCNC: 2.8 G/DL (ref 1.9–3.5)
GLUCOSE SERPL-MCNC: 104 MG/DL (ref 65–99)
HCT VFR BLD AUTO: 46.5 % (ref 37–47)
HDLC SERPL-MCNC: 35 MG/DL
HGB BLD-MCNC: 15.9 G/DL (ref 12–16)
IMM GRANULOCYTES # BLD AUTO: 0.03 K/UL (ref 0–0.11)
IMM GRANULOCYTES NFR BLD AUTO: 0.4 % (ref 0–0.9)
LDLC SERPL CALC-MCNC: 137 MG/DL
LYMPHOCYTES # BLD AUTO: 2.52 K/UL (ref 1–4.8)
LYMPHOCYTES NFR BLD: 30.4 % (ref 22–41)
MCH RBC QN AUTO: 30.7 PG (ref 27–33)
MCHC RBC AUTO-ENTMCNC: 34.2 G/DL (ref 33.6–35)
MCV RBC AUTO: 89.8 FL (ref 81.4–97.8)
MONOCYTES # BLD AUTO: 0.67 K/UL (ref 0–0.85)
MONOCYTES NFR BLD AUTO: 8.1 % (ref 0–13.4)
NEUTROPHILS # BLD AUTO: 4.78 K/UL (ref 2–7.15)
NEUTROPHILS NFR BLD: 57.5 % (ref 44–72)
NRBC # BLD AUTO: 0 K/UL
NRBC BLD-RTO: 0 /100 WBC
PLATELET # BLD AUTO: 237 K/UL (ref 164–446)
PMV BLD AUTO: 11 FL (ref 9–12.9)
POTASSIUM SERPL-SCNC: 4.4 MMOL/L (ref 3.6–5.5)
PROT SERPL-MCNC: 7.2 G/DL (ref 6–8.2)
RBC # BLD AUTO: 5.18 M/UL (ref 4.2–5.4)
SODIUM SERPL-SCNC: 142 MMOL/L (ref 135–145)
TRIGL SERPL-MCNC: 142 MG/DL (ref 0–149)
TSH SERPL DL<=0.005 MIU/L-ACNC: 2.68 UIU/ML (ref 0.38–5.33)
WBC # BLD AUTO: 8.3 K/UL (ref 4.8–10.8)

## 2022-10-26 PROCEDURE — 80053 COMPREHEN METABOLIC PANEL: CPT

## 2022-10-26 PROCEDURE — 80061 LIPID PANEL: CPT

## 2022-10-26 PROCEDURE — 84443 ASSAY THYROID STIM HORMONE: CPT

## 2022-10-26 PROCEDURE — 85025 COMPLETE CBC W/AUTO DIFF WBC: CPT

## 2022-10-26 PROCEDURE — 36415 COLL VENOUS BLD VENIPUNCTURE: CPT

## 2022-11-16 ENCOUNTER — HOSPITAL ENCOUNTER (OUTPATIENT)
Dept: RADIOLOGY | Facility: MEDICAL CENTER | Age: 52
End: 2022-11-16
Attending: INTERNAL MEDICINE
Payer: COMMERCIAL

## 2022-11-16 DIAGNOSIS — Z12.31 VISIT FOR SCREENING MAMMOGRAM: ICD-10-CM

## 2022-11-16 PROCEDURE — 77063 BREAST TOMOSYNTHESIS BI: CPT

## 2022-11-29 ENCOUNTER — EH NON-PROVIDER (OUTPATIENT)
Dept: OCCUPATIONAL MEDICINE | Facility: CLINIC | Age: 52
End: 2022-11-29

## 2022-11-29 DIAGNOSIS — Z02.89 ENCOUNTER FOR OCCUPATIONAL HEALTH ASSESSMENT: ICD-10-CM

## 2022-11-29 PROCEDURE — 94375 RESPIRATORY FLOW VOLUME LOOP: CPT | Performed by: PREVENTIVE MEDICINE

## 2023-02-28 PROCEDURE — RXMED WILLOW AMBULATORY MEDICATION CHARGE: Performed by: OBSTETRICS & GYNECOLOGY

## 2023-03-01 ENCOUNTER — PHARMACY VISIT (OUTPATIENT)
Dept: PHARMACY | Facility: MEDICAL CENTER | Age: 53
End: 2023-03-01
Payer: COMMERCIAL

## 2023-09-09 ENCOUNTER — PATIENT MESSAGE (OUTPATIENT)
Dept: MEDICAL GROUP | Facility: LAB | Age: 53
End: 2023-09-09
Payer: COMMERCIAL

## 2023-09-21 ENCOUNTER — OFFICE VISIT (OUTPATIENT)
Dept: MEDICAL GROUP | Facility: LAB | Age: 53
End: 2023-09-21
Payer: COMMERCIAL

## 2023-09-21 VITALS
BODY MASS INDEX: 33.16 KG/M2 | DIASTOLIC BLOOD PRESSURE: 88 MMHG | HEIGHT: 68 IN | HEART RATE: 68 BPM | OXYGEN SATURATION: 94 % | WEIGHT: 218.8 LBS | SYSTOLIC BLOOD PRESSURE: 122 MMHG

## 2023-09-21 DIAGNOSIS — E78.5 DYSLIPIDEMIA: ICD-10-CM

## 2023-09-21 DIAGNOSIS — M25.562 CHRONIC PAIN OF LEFT KNEE: ICD-10-CM

## 2023-09-21 DIAGNOSIS — Z23 NEED FOR INFLUENZA VACCINATION: ICD-10-CM

## 2023-09-21 DIAGNOSIS — E55.9 VITAMIN D DEFICIENCY: ICD-10-CM

## 2023-09-21 DIAGNOSIS — G89.29 CHRONIC PAIN OF LEFT KNEE: ICD-10-CM

## 2023-09-21 DIAGNOSIS — Z23 NEED FOR SHINGLES VACCINE: ICD-10-CM

## 2023-09-21 DIAGNOSIS — Z11.59 NEED FOR HEPATITIS C SCREENING TEST: ICD-10-CM

## 2023-09-21 DIAGNOSIS — R89.9 ABNORMAL LABORATORY TEST: ICD-10-CM

## 2023-09-21 DIAGNOSIS — Z12.11 SCREEN FOR COLON CANCER: ICD-10-CM

## 2023-09-21 PROCEDURE — 3079F DIAST BP 80-89 MM HG: CPT | Performed by: INTERNAL MEDICINE

## 2023-09-21 PROCEDURE — 90471 IMMUNIZATION ADMIN: CPT | Performed by: INTERNAL MEDICINE

## 2023-09-21 PROCEDURE — 99214 OFFICE O/P EST MOD 30 MIN: CPT | Mod: 25 | Performed by: INTERNAL MEDICINE

## 2023-09-21 PROCEDURE — 3074F SYST BP LT 130 MM HG: CPT | Performed by: INTERNAL MEDICINE

## 2023-09-21 PROCEDURE — 90686 IIV4 VACC NO PRSV 0.5 ML IM: CPT | Performed by: INTERNAL MEDICINE

## 2023-09-21 ASSESSMENT — FIBROSIS 4 INDEX: FIB4 SCORE: 0.86

## 2023-09-21 NOTE — PROGRESS NOTES
CC: Diana Hurtado is a 52 y.o. female is suffering from left knee pain    SUBJECTIVE:  1. Chronic pain of left knee  Left knee popping. Grater than 6 months.   Intensity 5-6/10, quality dull anchy, radiation medial, associated signs and symptoms deny, time component during gowing up stairs, improved with rest , worse with activity. .MRI  Referral to Dr. Seay upon request .    2. Dyslipidemia  CT cardiac scoring ordered  recently underwent coronary artery bypass graft was concerned about her children    3. Vitamin D deficiency  Recheck vitamin D    4. Abnormal laboratory test  Recheck CBC CMP    5.  Need for hepatitis C screening test:  Check for hepatitis C    6. Need for influenza vaccination  Vaccination given    7. Screen for colon cancer  Screening for colon cancer ordered    8. Need for shingles vaccine  Prescription written for Shingrix vaccination        Past social, family, history: , manager outpatient physical therapy  Social History     Tobacco Use    Smoking status: Never    Smokeless tobacco: Never   Substance Use Topics    Alcohol use: Yes    Drug use: No         MEDICATIONS:    Current Outpatient Medications:     NON SPECIFIED, Please vaccinate with Shingles., Disp: 1 Each, Rfl: 1    estradiol (ESTRACE) 0.1 MG/GM vaginal cream, insert 0.5gm Vaginally Two times a Week  30 days, Disp: 42.5 g, Rfl: 6    Hydrocortisone Acetate 1 % Cream, Use 1 application Externally twice a day 14 days, Disp: 15 g, Rfl: 2    levonorgestrel (MIRENA) 20 MCG/DAY IUD, 1 Each by Intrauterine route one time., Disp: , Rfl:     PROBLEMS:  Patient Active Problem List    Diagnosis Date Noted    Dyslipidemia 05/09/2013       REVIEW OF SYSTEMS:  Gen.:  No Nausea, Vomiting, fever, Chills.  Heart: No chest pain.  Lungs:  No shortness of Breath.  Psychological: José unusual Anxiety depression     PHYSICAL EXAM   Constitutional: Alert, cooperative, not in acute distress.  Cardiovascular:  Rate Rhythm is regular  "without murmurs rubs clicks.     Thorax & Lungs: Clear to auscultation, no wheezing, rhonchi, or rales  HENT: Normocephalic, Atraumatic.  Eyes: PERRLA, EOMI, Conjunctiva normal.   Neck: Trachia is midline no swelling of the thyroid.   Lymphatic: No lymphadenopathy noted.   Neurologic: Alert & oriented x 3, cranial nerves II through XII are intact, Normal motor function, Normal sensory function, No focal deficits noted.   Psychiatric: Affect normal, Judgment normal, Mood normal.     VITAL SIGNS:/88 (BP Location: Left arm)   Pulse 68   Ht 1.727 m (5' 8\")   Wt 99.2 kg (218 lb 12.8 oz)   SpO2 94%   BMI 33.27 kg/m²     Labs: Reviewed    Assessment:                                                     Plan:    1. Chronic pain of left knee  Chronic left knee pain referral written to physical therapy x-rays ordered MRI ordered referral to orthopedic surgery upon request Dr. Armijo  - DX-KNEE 3 VIEWS Other - Please Comment; Future  - Comp Metabolic Panel; Future  - CBC WITH DIFFERENTIAL; Future  - Referral to Physical Therapy  - MR-KNEE-W/O LEFT; Future    2. Dyslipidemia  Check lipid profile CT cardiac scoring ordered,  with a history of coronary artery bypass graft surgery at 53  - CT-CARDIAC SCORING; Future  - Comp Metabolic Panel; Future  - CBC WITH DIFFERENTIAL; Future  - Lipid Profile; Future    3. Vitamin D deficiency  Recheck vitamin D  - VITAMIN D,25 HYDROXY (DEFICIENCY); Future  - Comp Metabolic Panel; Future  - CBC WITH DIFFERENTIAL; Future    4. Abnormal laboratory test  Recheck CBC CMP  - Comp Metabolic Panel; Future  - CBC WITH DIFFERENTIAL; Future    5. Need for hepatitis C screening test  Check hepatitis C  - HEP C VIRUS ANTIBODY; Future    6. Need for influenza vaccination  Vaccination given  - Influenza Vaccine Quad Injection (PF)    7. Screen for colon cancer  Referral written  - Referral to Gastroenterology    8. Need for shingles vaccine  Prescription written  - NON SPECIFIED; Please " vaccinate with Shingles.  Dispense: 1 Each; Refill: 1

## 2023-09-22 ENCOUNTER — HOSPITAL ENCOUNTER (OUTPATIENT)
Dept: RADIOLOGY | Facility: MEDICAL CENTER | Age: 53
End: 2023-09-22
Attending: INTERNAL MEDICINE
Payer: COMMERCIAL

## 2023-09-22 DIAGNOSIS — E78.5 DYSLIPIDEMIA: ICD-10-CM

## 2023-09-22 PROCEDURE — 4410556 CT-CARDIAC SCORING (SELF PAY ONLY)

## 2023-09-28 ENCOUNTER — TELEPHONE (OUTPATIENT)
Dept: PHYSICAL THERAPY | Facility: REHABILITATION | Age: 53
End: 2023-09-28
Payer: COMMERCIAL

## 2023-09-28 NOTE — OP THERAPY DISCHARGE SUMMARY
Outpatient Physical Therapy  DISCHARGE SUMMARY NOTE      [facility] Physical [address].  [address]  [place] [address]  Phone:  [phone]  Fax:  [phone]    Date of Visit: 09/28/2023    Patient: Diana Hurtado  YOB: 1970  MRN: [id number]     Referring Provider: CONSTANCE Block  Reason for Visit      Referring Diagnosis          Functional Assessment Used        Your patient is being discharged from Physical Therapy with the following comments:   Goals partially met    No patient contact since   8/11/22 / /  .  Patient is independent with her HEP and is being discharged from therapy at this time.   Danyel Robison, PT, DPT, OCS    Date: 9/28/2023

## 2023-09-30 ENCOUNTER — HOSPITAL ENCOUNTER (OUTPATIENT)
Dept: LAB | Facility: MEDICAL CENTER | Age: 53
End: 2023-09-30
Attending: INTERNAL MEDICINE
Payer: COMMERCIAL

## 2023-09-30 ENCOUNTER — HOSPITAL ENCOUNTER (OUTPATIENT)
Dept: RADIOLOGY | Facility: MEDICAL CENTER | Age: 53
End: 2023-09-30
Attending: INTERNAL MEDICINE
Payer: COMMERCIAL

## 2023-09-30 DIAGNOSIS — R89.9 ABNORMAL LABORATORY TEST: ICD-10-CM

## 2023-09-30 DIAGNOSIS — M25.562 CHRONIC PAIN OF LEFT KNEE: ICD-10-CM

## 2023-09-30 DIAGNOSIS — G89.29 CHRONIC PAIN OF LEFT KNEE: ICD-10-CM

## 2023-09-30 DIAGNOSIS — E55.9 VITAMIN D DEFICIENCY: ICD-10-CM

## 2023-09-30 DIAGNOSIS — E78.5 DYSLIPIDEMIA: ICD-10-CM

## 2023-09-30 DIAGNOSIS — Z11.59 NEED FOR HEPATITIS C SCREENING TEST: ICD-10-CM

## 2023-09-30 LAB
25(OH)D3 SERPL-MCNC: 31 NG/ML (ref 30–100)
ALBUMIN SERPL BCP-MCNC: 4.4 G/DL (ref 3.2–4.9)
ALBUMIN/GLOB SERPL: 1.5 G/DL
ALP SERPL-CCNC: 82 U/L (ref 30–99)
ALT SERPL-CCNC: 14 U/L (ref 2–50)
ANION GAP SERPL CALC-SCNC: 12 MMOL/L (ref 7–16)
AST SERPL-CCNC: 15 U/L (ref 12–45)
BASOPHILS # BLD AUTO: 1 % (ref 0–1.8)
BASOPHILS # BLD: 0.08 K/UL (ref 0–0.12)
BILIRUB SERPL-MCNC: 0.7 MG/DL (ref 0.1–1.5)
BUN SERPL-MCNC: 16 MG/DL (ref 8–22)
CALCIUM ALBUM COR SERPL-MCNC: 8.9 MG/DL (ref 8.5–10.5)
CALCIUM SERPL-MCNC: 9.2 MG/DL (ref 8.5–10.5)
CHLORIDE SERPL-SCNC: 105 MMOL/L (ref 96–112)
CHOLEST SERPL-MCNC: 185 MG/DL (ref 100–199)
CO2 SERPL-SCNC: 24 MMOL/L (ref 20–33)
CREAT SERPL-MCNC: 0.95 MG/DL (ref 0.5–1.4)
EOSINOPHIL # BLD AUTO: 0.31 K/UL (ref 0–0.51)
EOSINOPHIL NFR BLD: 3.8 % (ref 0–6.9)
ERYTHROCYTE [DISTWIDTH] IN BLOOD BY AUTOMATED COUNT: 40.4 FL (ref 35.9–50)
GFR SERPLBLD CREATININE-BSD FMLA CKD-EPI: 72 ML/MIN/1.73 M 2
GLOBULIN SER CALC-MCNC: 2.9 G/DL (ref 1.9–3.5)
GLUCOSE SERPL-MCNC: 99 MG/DL (ref 65–99)
HCT VFR BLD AUTO: 47.7 % (ref 37–47)
HCV AB SER QL: NORMAL
HDLC SERPL-MCNC: 36 MG/DL
HGB BLD-MCNC: 16.1 G/DL (ref 12–16)
IMM GRANULOCYTES # BLD AUTO: 0.02 K/UL (ref 0–0.11)
IMM GRANULOCYTES NFR BLD AUTO: 0.2 % (ref 0–0.9)
LDLC SERPL CALC-MCNC: 127 MG/DL
LYMPHOCYTES # BLD AUTO: 2.71 K/UL (ref 1–4.8)
LYMPHOCYTES NFR BLD: 33.1 % (ref 22–41)
MCH RBC QN AUTO: 30.6 PG (ref 27–33)
MCHC RBC AUTO-ENTMCNC: 33.8 G/DL (ref 32.2–35.5)
MCV RBC AUTO: 90.7 FL (ref 81.4–97.8)
MONOCYTES # BLD AUTO: 0.67 K/UL (ref 0–0.85)
MONOCYTES NFR BLD AUTO: 8.2 % (ref 0–13.4)
NEUTROPHILS # BLD AUTO: 4.4 K/UL (ref 1.82–7.42)
NEUTROPHILS NFR BLD: 53.7 % (ref 44–72)
NRBC # BLD AUTO: 0 K/UL
NRBC BLD-RTO: 0 /100 WBC (ref 0–0.2)
PLATELET # BLD AUTO: 200 K/UL (ref 164–446)
PMV BLD AUTO: 10.7 FL (ref 9–12.9)
POTASSIUM SERPL-SCNC: 4.4 MMOL/L (ref 3.6–5.5)
PROT SERPL-MCNC: 7.3 G/DL (ref 6–8.2)
RBC # BLD AUTO: 5.26 M/UL (ref 4.2–5.4)
SODIUM SERPL-SCNC: 141 MMOL/L (ref 135–145)
TRIGL SERPL-MCNC: 109 MG/DL (ref 0–149)
WBC # BLD AUTO: 8.2 K/UL (ref 4.8–10.8)

## 2023-09-30 PROCEDURE — 80061 LIPID PANEL: CPT

## 2023-09-30 PROCEDURE — 36415 COLL VENOUS BLD VENIPUNCTURE: CPT

## 2023-09-30 PROCEDURE — 80053 COMPREHEN METABOLIC PANEL: CPT

## 2023-09-30 PROCEDURE — 73562 X-RAY EXAM OF KNEE 3: CPT | Mod: LT

## 2023-09-30 PROCEDURE — 86803 HEPATITIS C AB TEST: CPT

## 2023-09-30 PROCEDURE — 82306 VITAMIN D 25 HYDROXY: CPT

## 2023-09-30 PROCEDURE — 85025 COMPLETE CBC W/AUTO DIFF WBC: CPT

## 2023-10-11 ENCOUNTER — HOSPITAL ENCOUNTER (OUTPATIENT)
Dept: RADIOLOGY | Facility: MEDICAL CENTER | Age: 53
End: 2023-10-11
Attending: INTERNAL MEDICINE
Payer: COMMERCIAL

## 2023-10-11 ENCOUNTER — TELEPHONE (OUTPATIENT)
Dept: MEDICAL GROUP | Facility: LAB | Age: 53
End: 2023-10-11
Payer: COMMERCIAL

## 2023-10-11 ENCOUNTER — PHARMACY VISIT (OUTPATIENT)
Dept: PHARMACY | Facility: MEDICAL CENTER | Age: 53
End: 2023-10-11
Payer: COMMERCIAL

## 2023-10-11 DIAGNOSIS — G89.29 CHRONIC PAIN OF LEFT KNEE: ICD-10-CM

## 2023-10-11 DIAGNOSIS — M25.562 CHRONIC PAIN OF LEFT KNEE: ICD-10-CM

## 2023-10-11 PROCEDURE — 73721 MRI JNT OF LWR EXTRE W/O DYE: CPT | Mod: LT

## 2023-10-11 PROCEDURE — RXMED WILLOW AMBULATORY MEDICATION CHARGE: Performed by: INTERNAL MEDICINE

## 2023-11-17 ENCOUNTER — HOSPITAL ENCOUNTER (OUTPATIENT)
Dept: RADIOLOGY | Facility: MEDICAL CENTER | Age: 53
End: 2023-11-17
Attending: OBSTETRICS & GYNECOLOGY
Payer: COMMERCIAL

## 2023-11-17 DIAGNOSIS — Z12.31 VISIT FOR SCREENING MAMMOGRAM: ICD-10-CM

## 2023-11-17 PROCEDURE — 77063 BREAST TOMOSYNTHESIS BI: CPT

## 2023-11-29 ENCOUNTER — EH NON-PROVIDER (OUTPATIENT)
Dept: OCCUPATIONAL MEDICINE | Facility: CLINIC | Age: 53
End: 2023-11-29

## 2023-11-29 DIAGNOSIS — Z02.89 ENCOUNTER FOR OCCUPATIONAL HEALTH EXAMINATION INVOLVING RESPIRATOR: ICD-10-CM

## 2023-11-29 PROCEDURE — 94375 RESPIRATORY FLOW VOLUME LOOP: CPT | Performed by: NURSE PRACTITIONER

## 2023-12-07 ENCOUNTER — PHYSICAL THERAPY (OUTPATIENT)
Dept: PHYSICAL THERAPY | Facility: REHABILITATION | Age: 53
End: 2023-12-07
Attending: INTERNAL MEDICINE
Payer: COMMERCIAL

## 2023-12-07 DIAGNOSIS — G89.29 CHRONIC PAIN OF LEFT KNEE: ICD-10-CM

## 2023-12-07 DIAGNOSIS — M25.562 CHRONIC PAIN OF LEFT KNEE: ICD-10-CM

## 2023-12-07 PROCEDURE — 97161 PT EVAL LOW COMPLEX 20 MIN: CPT

## 2023-12-07 PROCEDURE — 97140 MANUAL THERAPY 1/> REGIONS: CPT

## 2023-12-07 PROCEDURE — 97014 ELECTRIC STIMULATION THERAPY: CPT

## 2023-12-07 ASSESSMENT — ENCOUNTER SYMPTOMS
PAIN SCALE: 3
QUALITY: ACHING
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
## 2023-12-08 ASSESSMENT — ENCOUNTER SYMPTOMS
PAIN SCALE AT HIGHEST: 5
PAIN SCALE AT LOWEST: 0

## 2023-12-13 ENCOUNTER — PHYSICAL THERAPY (OUTPATIENT)
Dept: PHYSICAL THERAPY | Facility: REHABILITATION | Age: 53
End: 2023-12-13
Attending: INTERNAL MEDICINE
Payer: COMMERCIAL

## 2023-12-13 DIAGNOSIS — G89.29 CHRONIC PAIN OF LEFT KNEE: ICD-10-CM

## 2023-12-13 DIAGNOSIS — M25.562 CHRONIC PAIN OF LEFT KNEE: ICD-10-CM

## 2023-12-13 PROCEDURE — 97110 THERAPEUTIC EXERCISES: CPT

## 2023-12-13 NOTE — OP THERAPY DAILY TREATMENT
Outpatient Physical Therapy  DAILY TREATMENT     [facility] Physical Therapy [address].  [address]  [place] [address]  Phone:  [phone]  Fax:  [phone]    Date: 12/13/2023    Patient: Diana Hurtado  YOB: 1970  MRN: [id number]     Time Calculation    Start time: 0803  Stop time: 0846 Time Calculation (min): 43 minutes         Chief Complaint: No chief complaint on file.    Visit #: 2    SUBJECTIVE:  Better, less pain with stairs but still makes noise    OBJECTIVE:  Limited L hip IR 25--            Therapeutic Treatments and Modalities:     Therapeutic Treatment and Modalities Summary: Toy soldiers #2 band  Bridge marching  S/l running man   Tke quad in straddle stance TKE gluts to fatigue #4 band  A#### ll hep to fatigue hourly  Gait trg with L glut retraction  Fitter for t-plane training  S/l bent knee/straight L trunk rotation  S/l hip ext IR--hip  Rocking with IR hio[ focau    Time-based treatments/modalities:    Physical Therapy Timed Treatment Charges  Therapeutic exercise minutes (CPT 06176): 40 minutes      Pain rating (1-10) before treatment:  0  Pain rating (1-10) after treatment:  0    ASSESSMENT:   Improving function w/ decreased reports of pain but limited hip IR and t-plane motion control with ambulation with limited G max strength and functional recruitment patterns.    PLAN/RECOMMENDATIONS:   Hip rotation progression, post chain progression, DN VL, quad strength  --reviewed HEP, assess gait pattern with attention to L gluts in mid stance

## 2023-12-15 ENCOUNTER — PHYSICAL THERAPY (OUTPATIENT)
Dept: PHYSICAL THERAPY | Facility: REHABILITATION | Age: 53
End: 2023-12-15
Attending: INTERNAL MEDICINE
Payer: COMMERCIAL

## 2023-12-15 DIAGNOSIS — G89.29 CHRONIC PAIN OF LEFT KNEE: ICD-10-CM

## 2023-12-15 DIAGNOSIS — M25.562 CHRONIC PAIN OF LEFT KNEE: ICD-10-CM

## 2023-12-15 PROCEDURE — 97140 MANUAL THERAPY 1/> REGIONS: CPT

## 2023-12-15 PROCEDURE — 97110 THERAPEUTIC EXERCISES: CPT

## 2023-12-15 PROCEDURE — 97014 ELECTRIC STIMULATION THERAPY: CPT

## 2023-12-15 NOTE — OP THERAPY DAILY TREATMENT
Outpatient Physical Therapy  DAILY TREATMENT     [facility] Physical [address].  [address]  [place] [address]  Phone:  [phone]  Fax:  [phone]    Date: 12/15/2023    Patient: Diana Hurtado  YOB: 1970  MRN: [id number]     Time Calculation    Start time: 0930  Stop time: 1040 Time Calculation (min): 70 minutes         Chief Complaint: No chief complaint on file.    Visit #: 3    SUBJECTIVE:  Knee is a little sore but no tape and lots of walking    OBJECTIVE:              Therapeutic Treatments and Modalities:     Therapeutic Treatment and Modalities Summary: Gait trg --struggled with t-plane pelvic motion  S/l pnf asymmetrical scapulo-pelvic pattern with RI,RS, CI,SR  Toy soldiers #2 band--S/l running man (focus on t-plane rotation with hip extension and gh flexio) reviewed  Bridge marching--poor pelvic strength, control L glut weaker than R--hep focus  S/l hip ext IR--hip  Rocking with IR hip--hep--reviewed    DN: Patient signed informed written release and verbally agreed with informed consent to procedure of dry needling   skin prep with isopropyl  Alcohol  --L VL,RF--distal 1/3  -TENS w/ FDN  -MHP x 10'  -No adverse reactions observed post treatment   Russian 5/5/ vl RF, mhp x 15'  Medial patellar tape    Time-based treatments/modalities:    Physical Therapy Timed Treatment Charges  Gait training minutes (CPT 42514): 5 minutes  Manual therapy minutes (CPT 87263): 10 minutes  Therapeutic exercise minutes (CPT 88909): 25 minutes      Pain rating (1-10) before treatment:  0  Pain rating (1-10) after treatment:  0    ASSESSMENT:   Fair- hep compliance. Limited glut recruitment and strength to stabilize pelvis in transverse plane and struggles with active control of hip IR    PLAN/RECOMMENDATIONS:   Hip rotation progression, post chain progression, DN VL, quad strength  --reviewed HEP, assess gait pattern with attention to L gluts in mid stance

## 2023-12-19 ENCOUNTER — APPOINTMENT (OUTPATIENT)
Dept: PHYSICAL THERAPY | Facility: REHABILITATION | Age: 53
End: 2023-12-19
Attending: INTERNAL MEDICINE
Payer: COMMERCIAL

## 2023-12-21 ENCOUNTER — APPOINTMENT (OUTPATIENT)
Dept: PHYSICAL THERAPY | Facility: REHABILITATION | Age: 53
End: 2023-12-21
Attending: INTERNAL MEDICINE
Payer: COMMERCIAL

## 2023-12-26 ENCOUNTER — OFFICE VISIT (OUTPATIENT)
Dept: MEDICAL GROUP | Facility: LAB | Age: 53
End: 2023-12-26
Payer: COMMERCIAL

## 2023-12-26 VITALS
HEART RATE: 67 BPM | BODY MASS INDEX: 29.65 KG/M2 | OXYGEN SATURATION: 96 % | HEIGHT: 68 IN | SYSTOLIC BLOOD PRESSURE: 108 MMHG | TEMPERATURE: 98.4 F | DIASTOLIC BLOOD PRESSURE: 72 MMHG | RESPIRATION RATE: 12 BRPM

## 2023-12-26 DIAGNOSIS — J06.9 UPPER RESPIRATORY TRACT INFECTION, UNSPECIFIED TYPE: ICD-10-CM

## 2023-12-26 LAB
FLUAV RNA SPEC QL NAA+PROBE: NEGATIVE
FLUBV RNA SPEC QL NAA+PROBE: NEGATIVE
RSV RNA SPEC QL NAA+PROBE: NEGATIVE
SARS-COV-2 RNA RESP QL NAA+PROBE: NEGATIVE

## 2023-12-26 PROCEDURE — 0241U POCT CEPHEID COV-2, FLU A/B, RSV - PCR: CPT | Performed by: INTERNAL MEDICINE

## 2023-12-26 PROCEDURE — 99213 OFFICE O/P EST LOW 20 MIN: CPT | Performed by: INTERNAL MEDICINE

## 2023-12-26 PROCEDURE — 3074F SYST BP LT 130 MM HG: CPT | Performed by: INTERNAL MEDICINE

## 2023-12-26 PROCEDURE — 3078F DIAST BP <80 MM HG: CPT | Performed by: INTERNAL MEDICINE

## 2023-12-27 NOTE — PROGRESS NOTES
CC: Diana Hurtado is a 53 y.o. female is suffering from   Chief Complaint   Patient presents with    Cough         SUBJECTIVE:  1. Upper respiratory tract infection, unspecified type  Diana is here for follow-up has experienced upper respiratory tract symptoms, works in the hospital        Past social, family, history:   Social History     Tobacco Use    Smoking status: Never    Smokeless tobacco: Never   Substance Use Topics    Alcohol use: Yes    Drug use: No         MEDICATIONS:    Current Outpatient Medications:     GAVILYTE-G 236 g Recon Soln, , Disp: , Rfl:     Zoster Vac Recomb Adjuvanted (SHINGRIX) 50 MCG/0.5ML Recon Susp, Inject  into the shoulder, thigh, or buttocks., Disp: 0.5 mL, Rfl: 0    NON SPECIFIED, Please vaccinate with Shingles., Disp: 1 Each, Rfl: 1    estradiol (ESTRACE) 0.1 MG/GM vaginal cream, insert 0.5gm Vaginally Two times a Week  30 days, Disp: 42.5 g, Rfl: 6    Hydrocortisone Acetate 1 % Cream, Use 1 application Externally twice a day 14 days, Disp: 15 g, Rfl: 2    levonorgestrel (MIRENA) 20 MCG/DAY IUD, 1 Each by Intrauterine route one time., Disp: , Rfl:     PROBLEMS:  Patient Active Problem List    Diagnosis Date Noted    Dyslipidemia 05/09/2013       REVIEW OF SYSTEMS:  Gen.:  No Nausea, Vomiting, fever, Chills.  Heart: No chest pain.  Lungs:  No shortness of Breath.  Psychological: José unusual Anxiety depression     PHYSICAL EXAM   Constitutional: Alert, cooperative, not in acute distress.  Cardiovascular:  Rate Rhythm is regular without murmurs rubs clicks.     Thorax & Lungs: Clear to auscultation, no wheezing, rhonchi, or rales  HENT: Normocephalic, Atraumatic.  Eyes: PERRLA, EOMI, Conjunctiva normal.   Neck: Trachia is midline no swelling of the thyroid.   Lymphatic: No lymphadenopathy noted.   Neurologic: Alert & oriented x 3, cranial nerves II through XII are intact, Normal motor function, Normal sensory function, No focal deficits noted.   Psychiatric:  "Affect normal, Judgment normal, Mood normal.     VITAL SIGNS:/72 (BP Location: Left arm, Patient Position: Sitting, BP Cuff Size: Large adult)   Pulse 67   Temp 36.9 °C (98.4 °F)   Resp 12   Ht 1.727 m (5' 8\")   SpO2 96%   BMI 29.65 kg/m²     Labs: Reviewed    Assessment:                                                     Plan:    1. Upper respiratory tract infection, unspecified type  Negative testing for COVID-19 influenza  - POCT CEPHEID COV-2, FLU A/B, RSV - PCR        "

## 2023-12-28 ENCOUNTER — APPOINTMENT (OUTPATIENT)
Dept: PHYSICAL THERAPY | Facility: REHABILITATION | Age: 53
End: 2023-12-28
Attending: INTERNAL MEDICINE
Payer: COMMERCIAL

## 2024-01-02 ENCOUNTER — PHYSICAL THERAPY (OUTPATIENT)
Dept: PHYSICAL THERAPY | Facility: REHABILITATION | Age: 54
End: 2024-01-02
Attending: INTERNAL MEDICINE
Payer: COMMERCIAL

## 2024-01-02 DIAGNOSIS — M54.50 ACUTE LEFT-SIDED LOW BACK PAIN WITHOUT SCIATICA: ICD-10-CM

## 2024-01-02 DIAGNOSIS — M25.562 CHRONIC PAIN OF LEFT KNEE: ICD-10-CM

## 2024-01-02 DIAGNOSIS — G89.29 CHRONIC PAIN OF LEFT KNEE: ICD-10-CM

## 2024-01-02 PROCEDURE — 97014 ELECTRIC STIMULATION THERAPY: CPT

## 2024-01-02 PROCEDURE — 97140 MANUAL THERAPY 1/> REGIONS: CPT

## 2024-01-02 PROCEDURE — 97110 THERAPEUTIC EXERCISES: CPT

## 2024-01-02 NOTE — OP THERAPY DAILY TREATMENT
"  Outpatient Physical Therapy  DAILY TREATMENT     [facility] Physical Therapy [address]  [address]  [place] [address]  Phone:  [phone]  Fax:  [phone]    Date: 01/02/2024    Patient: Diana Hurtado  YOB: 1970  MRN: [id number]     Time Calculation    Start time: 1015  Stop time: 1115 Time Calculation (min): 60 minutes         Chief Complaint: No chief complaint on file.    Visit #: 4    SUBJECTIVE:    Tape was too tight and tok pain off second day.  Lots of yard worka nd putting antonio away---ladders climnibing lifting  OBJECTIVE:  Pat compression test (-)            Therapeutic Treatments and Modalities:     Therapeutic Treatment and Modalities Summary: DN: Patient signed informed written release and verbally agreed with informed consent to procedure of dry needling   skin prep with Chlora prep  -L VL  -MHP x 10'  -No adverse reactions observed post treatment  Standing  Sls ir/er with carpet slide--hep  Toy soldiers #2 band--  S/l running man (focus on t-plane rotation with hip extension and gh flexio) reviewed  Bridge marching--poor pelvic strength, control L glut weaker than R--hep focus  S/l hip ext IR--hip  Rocking with IR hip--hep--reviewed    DN: Patient signed informed written release and verbally agreed with informed consent to procedure of dry needling   skin prep with isopropyl  Alcohol  --L VL,RF--distal 1/3  -TENS w/ FDN  -MHP x 10'  -No adverse reactions observed post treatment   Russian 5/5/ vl RF, mhp x 15'  Medial patellar tape w/ kinesio tape    Time-based treatments/modalities:    Physical Therapy Timed Treatment Charges  Manual therapy minutes (CPT 42794): 15 minutes  Therapeutic exercise minutes (CPT 33176): 25 minutes      Pain rating (1-10) before treatment:  2-deep  Pain rating (1-10) after treatment:  \"almost no  pain ,m quad is sore    ASSESSMENT:   Poor hep compliance. Limited glut recruitment and strength to stabilize pelvis in " transverse plane and struggles with active control of hip IR  Reproduced knee pain with DN to VL  PLAN/RECOMMENDATIONS:   Hip rotation progression, post chain progression, DN VL, quad strength  --reviewed HEP, assess gait pattern with attention to L gluts in mid stance

## 2024-01-04 ENCOUNTER — PHYSICAL THERAPY (OUTPATIENT)
Dept: PHYSICAL THERAPY | Facility: REHABILITATION | Age: 54
End: 2024-01-04
Attending: INTERNAL MEDICINE
Payer: COMMERCIAL

## 2024-01-04 DIAGNOSIS — M25.562 CHRONIC PAIN OF LEFT KNEE: ICD-10-CM

## 2024-01-04 DIAGNOSIS — G89.29 CHRONIC PAIN OF LEFT KNEE: ICD-10-CM

## 2024-01-04 PROCEDURE — 97110 THERAPEUTIC EXERCISES: CPT

## 2024-01-04 PROCEDURE — 97014 ELECTRIC STIMULATION THERAPY: CPT

## 2024-01-04 PROCEDURE — 97140 MANUAL THERAPY 1/> REGIONS: CPT

## 2024-01-04 NOTE — OP THERAPY DAILY TREATMENT
"  Outpatient Physical Therapy  DAILY TREATMENT     [facility] Physical Therapy [address].  [address]  [place] [address]  Phone:  [phone]  Fax:  [phone]    Date: 01/04/2024    Patient: Diana Hurtado  YOB: 1970  MRN: [id number]     Time Calculation    Start time: 1015  Stop time: 1105 Time Calculation (min): 50 minutes         Chief Complaint: No chief complaint on file.    Visit #: 5    SUBJECTIVE:  A little better but stil struggling with ex.    OBJECTIve:            Therapeutic Treatments and Modalities:     Therapeutic Treatment and Modalities Summary: DN: Patient signed informed written release and verbally agreed with informed consent to procedure of dry needling   skin prep with Chlora prep  -L G min/med  -MHP x 10'  -No adverse reactions observed post treatment  Bridge marching--struggles with l hip stab and control    [place]old[place] 5/5/ gmin/med with clams x 15'      Time-based treatments/modalities:    Physical Therapy Timed Treatment Charges  Manual therapy minutes (CPT 22076): 20 minutes  Therapeutic exercise minutes (CPT 88938): 5 minutes      Pain rating (1-10) before treatment:  1-2 -deep ache : slight  Pain rating (1-10) after treatment:  \"almost no  pain  quad is sore\"    ASSESSMENT:   Reproduced knee pain with needles to to gluts.  Cont. Weak L post hip stability with exercise  PLAN/RECOMMENDATIONS:   Hip rotation progression, post chain progression, DN VL, quad strength  --reviewed HEP, assess gait pattern with attention to L gluts in mid stance       "

## 2024-01-09 ENCOUNTER — PHYSICAL THERAPY (OUTPATIENT)
Dept: PHYSICAL THERAPY | Facility: REHABILITATION | Age: 54
End: 2024-01-09
Attending: INTERNAL MEDICINE
Payer: COMMERCIAL

## 2024-01-09 DIAGNOSIS — G89.29 CHRONIC PAIN OF LEFT KNEE: ICD-10-CM

## 2024-01-09 DIAGNOSIS — M25.562 CHRONIC PAIN OF LEFT KNEE: ICD-10-CM

## 2024-01-09 PROCEDURE — 97110 THERAPEUTIC EXERCISES: CPT

## 2024-01-09 PROCEDURE — 97014 ELECTRIC STIMULATION THERAPY: CPT

## 2024-01-09 PROCEDURE — 97140 MANUAL THERAPY 1/> REGIONS: CPT

## 2024-01-09 NOTE — OP THERAPY DAILY TREATMENT
"  Outpatient Physical Therapy  DAILY TREATMENT     [facility] Physical Therapy [address].  [address]  [place] [address]  Phone:  [phone]  Fax:  [phone]    Date: 01/09/2024    Patient: Diana Hurtado  YOB: 1970  MRN: [id number]     Time Calculation    Start time: 0245  Stop time: 0330 Time Calculation (min): 45 minutes         Chief Complaint: No chief complaint on file.    Visit #: 6    SUBJECTIVE:  Really sore for a few days days--not much ex due to schedule--less intense and frequent--still limited walking more than 15'.  Less crepitus overall, no more sevre crunching    OBJECTIVE:            Therapeutic Treatments and Modalities:     Therapeutic Treatment and Modalities Summary: Abd band isometirc holds x 30 --purple  band -knee and feet 30-45\" ea.  DN: Patient signed informed written release and verbally agreed with informed consent to procedure of dry needling   skin prep with Chlora prep  -L G min/med, TFL, proximal VL  -MHP x 10'  -No adverse reactions observed post treatment  Reviewed toy soldier abd to fatigue  Bridge marching--struggles with l hip stab and control   IASTM: vl. Vmo  S/l running to fatigue--hep  Active supine and seated slr active hamstring and nerve glides   Saxhtzl86/10 vl/gluts --isometric holds to faitue x 10' w/mhp// no knee pain      Time-based treatments/modalities:    Physical Therapy Timed Treatment Charges  Manual therapy minutes (CPT 69614): 18 minutes  Therapeutic exercise minutes (CPT 64987): 20 minutes      Pain rating (1-10) before treatment:  1-2 -deep ache glut and slight knee pain    Pain rating (1-10) after treatment:  \"almost no  pain  quad is sore\"    ASSESSMENT:   Reproduced knee pain with needles to  gluts and TFL/VL Cont. Weak L post hip stability with exercise--fair HEP compliance  PLAN/RECOMMENDATIONS:   Hip rotation progression, post chain progression, DN VL, quad strength  --reviewed HEP, assess gait pattern with " attention to L gluts in mid stance

## 2024-01-16 ENCOUNTER — PHYSICAL THERAPY (OUTPATIENT)
Dept: PHYSICAL THERAPY | Facility: REHABILITATION | Age: 54
End: 2024-01-16
Attending: INTERNAL MEDICINE
Payer: COMMERCIAL

## 2024-01-16 DIAGNOSIS — G89.29 CHRONIC PAIN OF LEFT KNEE: ICD-10-CM

## 2024-01-16 DIAGNOSIS — M25.562 CHRONIC PAIN OF LEFT KNEE: ICD-10-CM

## 2024-01-16 PROCEDURE — 97110 THERAPEUTIC EXERCISES: CPT

## 2024-01-16 PROCEDURE — 97140 MANUAL THERAPY 1/> REGIONS: CPT

## 2024-01-16 PROCEDURE — 97014 ELECTRIC STIMULATION THERAPY: CPT

## 2024-01-16 NOTE — OP THERAPY DAILY TREATMENT
Outpatient Physical Therapy  DAILY TREATMENT     [facility] Therapy [address].  [address]  [place] [address]  Phone:  [phone]  Fax:  [phone]    Date: 01/16/2024    Patient: Diana Hurtado  YOB: 1970  MRN: [id number]     Time Calculation    Start time: 1015  Stop time: 1120 Time Calculation (min): 65 minutes         Chief Complaint: No chief complaint on file.    Visit #: 7    SUBJECTIVE:  Lots of hip ex in chair with knee slightly worse    OBJECTIVE:  Pat. Compression and all ligament test at end range w/ o/p to knee --wnl, no pain  TTP: VL--severe--slight knee pain, g min., med ttp          Therapeutic Treatments and Modalities:     Therapeutic Treatment and Modalities Summary: Abd band isometirc holds w/ IRx 30   Tke #4 door with hip/knee ext.  DN: Patient signed informed written release and verbally agreed with informed consent to procedure of dry needling   skin prep with isopropyl alc  -L G min/med, TFL, proximal VL  -MHP x 10'  -No adverse reactions observed post treatment  Reviewed toy soldier abd to fatigue    IASTM: vl.   S/l running to fatigue--hep     Jteervm12/10 vl/gluts -balloon smash x 15' mhpo tyo gluts    Time-based treatments/modalities:    Physical Therapy Timed Treatment Charges  Manual therapy minutes (CPT 46070): 20 minutes  Therapeutic exercise minutes (CPT 20884): 20 minutes      Pain rating (1-10) before treatment:  3 -deep ache glut and slight knee pain    Pain rating (1-10) after treatment: 1/10 behind the knee    ASSESSMENT:   Reproduced knee pain with needles to  gluts and VL Cont. Weak L post hip stability with exercise with weak quads and pain with loaded TKE --weak hips but suspected VL/quads and pain progrenetor  PLAN/RECOMMENDATIONS:   Hip rotation progression, post chain progression, DN VL, quad strength  --reviewed HEP, assess gait pattern with attention to L gluts in mid stance

## 2024-01-31 ENCOUNTER — PHYSICAL THERAPY (OUTPATIENT)
Dept: PHYSICAL THERAPY | Facility: REHABILITATION | Age: 54
End: 2024-01-31
Attending: INTERNAL MEDICINE
Payer: COMMERCIAL

## 2024-01-31 DIAGNOSIS — G89.29 CHRONIC PAIN OF LEFT KNEE: ICD-10-CM

## 2024-01-31 DIAGNOSIS — M25.562 CHRONIC PAIN OF LEFT KNEE: ICD-10-CM

## 2024-01-31 PROCEDURE — 97014 ELECTRIC STIMULATION THERAPY: CPT

## 2024-01-31 PROCEDURE — 97110 THERAPEUTIC EXERCISES: CPT

## 2024-01-31 PROCEDURE — 97140 MANUAL THERAPY 1/> REGIONS: CPT

## 2024-01-31 NOTE — OP THERAPY DAILY TREATMENT
Outpatient Physical Therapy  DAILY TREATMENT     [facility] Physical Therapy [address].  [address]  [place] [address]  Phone:  [phone]  Fax:  [phone]    Date: 01/31/2024    Patient: Diana Hurtado  YOB: 1970  MRN: [id number]     Time Calculation    Start time: 0845  Stop time: 0930 Time Calculation (min): 45 minutes         Chief Complaint: No chief complaint on file.    Visit #: 8    SUBJECTIVE:  Overall less pain with occasional pain with increased activity    OBJECTIVE:  Pat. Compression and all ligament test at end range w/ o/p to knee --wnl, no pain  TTP: VL--severe--slight knee pain, g min., med ttp          Therapeutic Treatments and Modalities:     Therapeutic Treatment and Modalities Summary: Abd band isometirc holds w/ IRx 30   Sls abd gainst wall  DN: Patient signed informed written release and verbally agreed with informed consent to procedure of dry needling   skin prep with isopropyl alc  VL--reproduced concordant knee pain  -MHP x 10'  -No adverse reactions observed post treatment  Reviewed toy soldier abd to fatigue    Decreased palpaptory tenderness after DN--full squat w/o pain    Time-based treatments/modalities:    Physical Therapy Timed Treatment Charges  Manual therapy minutes (CPT 77854): 20 minutes  Therapeutic exercise minutes (CPT 75033): 10 minutes      Pain rating (1-10) before treatment:  no pain today    Pain rating (1-10) after treatment: 0    ASSESSMENT:   Pt. Reprots lessening knee pain with ADLs with occasional pain related to vogor Reproduced knee pain with needles to  gluts and VL Cont. --angely ex. Wo pain  PLAN/RECOMMENDATIONS:   Hip rotation progression, post chain progression, DN VL, quad strength  --reviewed HEP, assess gait pattern with attention to L gluts in mid stance     D/c 1-2 visits

## 2024-02-13 ENCOUNTER — APPOINTMENT (OUTPATIENT)
Dept: PHYSICAL THERAPY | Facility: REHABILITATION | Age: 54
End: 2024-02-13
Attending: INTERNAL MEDICINE
Payer: COMMERCIAL

## 2024-02-16 ENCOUNTER — PHYSICAL THERAPY (OUTPATIENT)
Dept: PHYSICAL THERAPY | Facility: REHABILITATION | Age: 54
End: 2024-02-16
Attending: INTERNAL MEDICINE
Payer: COMMERCIAL

## 2024-02-16 DIAGNOSIS — M25.562 CHRONIC PAIN OF LEFT KNEE: ICD-10-CM

## 2024-02-16 DIAGNOSIS — G89.29 CHRONIC PAIN OF LEFT KNEE: ICD-10-CM

## 2024-02-16 PROCEDURE — 97140 MANUAL THERAPY 1/> REGIONS: CPT

## 2024-02-16 PROCEDURE — 97014 ELECTRIC STIMULATION THERAPY: CPT

## 2024-02-16 PROCEDURE — 97110 THERAPEUTIC EXERCISES: CPT

## 2024-02-17 NOTE — OP THERAPY DAILY TREATMENT
"  Outpatient Physical Therapy  DAILY TREATMENT     [facility] Physical Therapy [address].  [address]  [place] [address]  Phone:  [phone]  Fax:  [phone]    Date: 02/16/2024    Patient: Diana Hurtado  YOB: 1970  MRN: [id number]     Time Calculation                   Chief Complaint: No chief complaint on file.    Visit #: 9    SUBJECTIVE:  Past 2 weeks ache in knee is slightly worse    OBJECTIVE:  Pat. Compression and all ligament test at end range w/ o/p to knee --wnl, no pain  TTP: VL--severe--slight knee pain, g min., med ttp          Therapeutic Treatments and Modalities:     Therapeutic Treatment and Modalities Summary:   Sls abd against wall--not performing at home--hep  S/l planks with clams #4--hep  Loaqs and balloon smash --hep    DN: Patient signed informed written release and verbally agreed with informed consent to procedure of dry needling   skin prep with isopropyl alc  VL- and VI-reproduced concordant knee pain    -No adverse reactions observed post treatment  Medial and rotaiton tape// less pain with squat  Reviewed toy soldier abd to fatigueCont to present with quad tendinopathy  Laq 10 lbs russian quad 10/10 x 5' then balloon smash  30-60\" holds x 10' w/ mhp  Sls abd wall--hep    Time-based treatments/modalities:           Pain rating (1-10) before treatment: 4-5 /10 \" constant ache past 3 day    Pain rating (1-10) after treatment: 0 \" no pain\"    ASSESSMENT:   Cont to present with quad tendinopathy with decreased pain with squats after taping--sx present with early sign of patlello fem OA with stiffness  in am and after seated with knee bent  Fair HEP with focus on lateral hip  PLAN/RECOMMENDATIONS:   Focus quad strength e/ hep,  DN VL, quad strength  --reviewed HEP, assess gait pattern with attention to L gluts in mid stance         " Prediabetes

## 2024-02-21 ENCOUNTER — PHYSICAL THERAPY (OUTPATIENT)
Dept: PHYSICAL THERAPY | Facility: REHABILITATION | Age: 54
End: 2024-02-21
Attending: INTERNAL MEDICINE
Payer: COMMERCIAL

## 2024-02-21 DIAGNOSIS — M25.562 CHRONIC PAIN OF LEFT KNEE: ICD-10-CM

## 2024-02-21 DIAGNOSIS — G89.29 CHRONIC PAIN OF LEFT KNEE: ICD-10-CM

## 2024-02-21 PROCEDURE — 97014 ELECTRIC STIMULATION THERAPY: CPT

## 2024-02-21 PROCEDURE — 97140 MANUAL THERAPY 1/> REGIONS: CPT

## 2024-02-21 PROCEDURE — 97110 THERAPEUTIC EXERCISES: CPT

## 2024-02-22 PROCEDURE — RXMED WILLOW AMBULATORY MEDICATION CHARGE: Performed by: INTERNAL MEDICINE

## 2024-02-22 NOTE — OP THERAPY DAILY TREATMENT
"  Outpatient Physical Therapy  DAILY TREATMENT     [facility] Physical Therapy [address].  [address]  [place] [address]  Phone:  [phone]  Fax:  [phone]    Date: 02/21/2024    Patient: Diana Hurtado  YOB: 1970  MRN: [id number]     Time Calculation    Start time: 0415  Stop time: 0510 Time Calculation (min): 55 minutes         Chief Complaint: No chief complaint on file.    Visit #: 10    SUBJECTIVE:  Much better and staying consistent with quad strength at much as possible with a sigificnt decrease in pain past few days    OBJECTIVE:  Pat. Compression and all ligament test at end range w/ o/p to knee --wnl, no pain  TTP: VL, VI--severe--          Therapeutic Treatments and Modalities:     Therapeutic Treatment and Modalities Summary:   Sls abd against wall--reviewed-hep  S/l planks with clams #4-bilateral to fatigue-hep  Laqs and balloon smash to fatigue    DN: Patient signed informed written release and verbally agreed with informed consent to procedure of dry needling   skin prep with isopropyl alc  VL- and VI-reproduced concordant knee pain    -No adverse reactions observed post treatment    Sls modified pistol squats with pt. To focus as much as possible at work--hep vary chair height at work t control for vigor  Laq 11.5 lbs russian quad 10/10 x x 15' w/ mhp  Sls abd wall--hep    Time-based treatments/modalities:    Physical Therapy Timed Treatment Charges  Manual therapy minutes (CPT 24373): 20 minutes  Therapeutic exercise minutes (CPT 95314): 20 minutes      Pain rating (1-10) before treatment:no pain /10 \"    Pain rating (1-10) after treatment: 0 \" no pain\"    ASSESSMENT:     PLAN/RECOMMENDATIONS:   Focus quad strength e/ hep,  DN VL, quad strength  --reviewed HEP, assess gait pattern with attention to L gluts in mid stance         "

## 2024-02-23 ENCOUNTER — APPOINTMENT (OUTPATIENT)
Dept: PHYSICAL THERAPY | Facility: REHABILITATION | Age: 54
End: 2024-02-23
Attending: INTERNAL MEDICINE
Payer: COMMERCIAL

## 2024-03-01 ENCOUNTER — PHYSICAL THERAPY (OUTPATIENT)
Dept: PHYSICAL THERAPY | Facility: REHABILITATION | Age: 54
End: 2024-03-01
Attending: INTERNAL MEDICINE
Payer: COMMERCIAL

## 2024-03-01 DIAGNOSIS — M25.562 CHRONIC PAIN OF LEFT KNEE: ICD-10-CM

## 2024-03-01 DIAGNOSIS — G89.29 CHRONIC PAIN OF LEFT KNEE: ICD-10-CM

## 2024-03-01 PROCEDURE — 97014 ELECTRIC STIMULATION THERAPY: CPT

## 2024-03-01 PROCEDURE — 97110 THERAPEUTIC EXERCISES: CPT

## 2024-03-01 PROCEDURE — 97140 MANUAL THERAPY 1/> REGIONS: CPT

## 2024-03-01 NOTE — OP THERAPY DAILY TREATMENT
"  Outpatient Physical Therapy  DAILY TREATMENT     [facility] Physical Therapy [address].  [address]  [place] [address]  Phone:  [phone]  Fax:  [phone]    Date: 03/01/2024    Patient: Diana Hurtado  YOB: 1970  MRN: [id number]     Time Calculation    Start time: 0250  Stop time: 0335 Time Calculation (min): 45 minutes         Chief Complaint: No chief complaint on file.    Visit #: 11    SUBJECTIVE:  Much better and staying consistent with quad strength  w/o pain for the past few days    OBJECTIVE:  Pat. Compression and all ligament test at end range w/ o/p to knee --wnl, no pain  TTP: VL, VI--minimal          Therapeutic Treatments and Modalities:     Therapeutic Treatment and Modalities Summary:   Sls abd against wall--reviewed-hep      DN: Patient signed informed written release and verbally agreed with informed consent to procedure of dry needling   skin prep with isopropyl alc  VL/VI- and VI-reproduced concordant knee pain    -No adverse reactions observed post treatment    Sls modified pistol squats with pt. To focus as much as possible at work--hep vary chair height at work t control for vigor  Laq 15 lbs russian quad 10/10 x x 15' w/ mhp  Sls abd wall w/ ball and added --hepw/ #4 tke band for quad focus    Time-based treatments/modalities:    Physical Therapy Timed Treatment Charges  Manual therapy minutes (CPT 28479): 15 minutes  Therapeutic exercise minutes (CPT 25111): 10 minutes      Pain rating (1-10) before treatment:no pain /10 \"    Pain rating (1-10) after treatment: 0 \" no pain\"    ASSESSMENT:     PLAN/RECOMMENDATIONS:   Focus quad strength e/ hep,  DN VL, quad strength  --reviewed HEP, assess gait pattern with attention to L gluts in mid stance         "

## 2024-03-14 ENCOUNTER — PHYSICAL THERAPY (OUTPATIENT)
Dept: PHYSICAL THERAPY | Facility: REHABILITATION | Age: 54
End: 2024-03-14
Attending: INTERNAL MEDICINE
Payer: COMMERCIAL

## 2024-03-14 DIAGNOSIS — M25.562 CHRONIC PAIN OF LEFT KNEE: ICD-10-CM

## 2024-03-14 DIAGNOSIS — G89.29 CHRONIC PAIN OF LEFT KNEE: ICD-10-CM

## 2024-03-14 DIAGNOSIS — M54.12 CERVICAL RADICULOPATHY: ICD-10-CM

## 2024-03-14 PROCEDURE — 97140 MANUAL THERAPY 1/> REGIONS: CPT

## 2024-03-14 PROCEDURE — 97110 THERAPEUTIC EXERCISES: CPT

## 2024-03-14 NOTE — OP THERAPY DAILY TREATMENT
"  Outpatient Physical Therapy  DAILY TREATMENT     [facility] Physical [address].  [address]  [place] [address]  Phone:  [phone]  Fax:  [phone]    Date: 03/14/2024    Patient: Diana Hurtado  YOB: 1970  MRN: [id number]     Time Calculation    Start time: 0845  Stop time: 0930 Time Calculation (min): 45 minutes         Chief Complaint: No chief complaint on file.    Visit #: 12    SUBJECTIVE:  Much better with patient stating no pain since last visit.  Patient reprots new c/o R 3rd digit paresthesia that has worsened ove th past week with computer use.  Patient reprots some releif with ULTT A    OBJECTIVE:  Pat. Compression and all ligament test at end range w/ o/p to knee --wnl, no pain  TTP: VL, VI--minimal          Therapeutic Treatments and Modalities:     Therapeutic Treatment and Modalities Summary: Snags with sheet seated and supine--hep  Latasha progression supine richardson seated with sheet assist--hep  Rris with therapist o/p// diminished odd sensation in 3rd finger// slightly less  HVLA c6-7 bilateral// slightly less ext.rot/sb    Time-based treatments/modalities:    Physical Therapy Timed Treatment Charges  Manual therapy minutes (CPT 93669): 30 minutes  Therapeutic exercise minutes (CPT 72413): 15 minutes      Pain rating (1-10) before treatment:no pain /10 \"    Pain rating (1-10) after treatment: 0 \" no pain\"    ASSESSMENT:   All goals met for quad pain and will hold 2-4 weeks to monitor  + doorbel sign, + ULTT A --patient presents with c/s radic with ULT for median nerve--possible acute carpal tunnel  PLAN/RECOMMENDATIONS:   Focus quad strength e/ hep,  DN VL, quad strength  --reviewed HEP, assess gait pattern with attention to L gluts in mid stance         "

## 2024-03-27 ENCOUNTER — PHARMACY VISIT (OUTPATIENT)
Dept: PHARMACY | Facility: MEDICAL CENTER | Age: 54
End: 2024-03-27
Payer: COMMERCIAL

## 2024-07-22 ENCOUNTER — OFFICE VISIT (OUTPATIENT)
Dept: MEDICAL GROUP | Facility: PHYSICIAN GROUP | Age: 54
End: 2024-07-22
Payer: COMMERCIAL

## 2024-07-22 VITALS
HEIGHT: 68 IN | RESPIRATION RATE: 16 BRPM | BODY MASS INDEX: 33.19 KG/M2 | WEIGHT: 219 LBS | HEART RATE: 85 BPM | OXYGEN SATURATION: 95 % | SYSTOLIC BLOOD PRESSURE: 110 MMHG | DIASTOLIC BLOOD PRESSURE: 70 MMHG | TEMPERATURE: 97.2 F

## 2024-07-22 DIAGNOSIS — J02.0 ACUTE STREPTOCOCCAL PHARYNGITIS: ICD-10-CM

## 2024-07-22 DIAGNOSIS — J02.9 SORE THROAT: ICD-10-CM

## 2024-07-22 DIAGNOSIS — R05.9 COUGH, UNSPECIFIED TYPE: ICD-10-CM

## 2024-07-22 LAB
FLUAV RNA SPEC QL NAA+PROBE: NEGATIVE
FLUBV RNA SPEC QL NAA+PROBE: NEGATIVE
RSV RNA SPEC QL NAA+PROBE: NEGATIVE
S PYO DNA SPEC NAA+PROBE: NORMAL
SARS-COV-2 RNA RESP QL NAA+PROBE: NEGATIVE

## 2024-07-22 PROCEDURE — 0241U POCT CEPHEID COV-2, FLU A/B, RSV - PCR: CPT | Performed by: NURSE PRACTITIONER

## 2024-07-22 PROCEDURE — 87651 STREP A DNA AMP PROBE: CPT | Performed by: NURSE PRACTITIONER

## 2024-07-22 PROCEDURE — 3078F DIAST BP <80 MM HG: CPT | Performed by: NURSE PRACTITIONER

## 2024-07-22 PROCEDURE — 99213 OFFICE O/P EST LOW 20 MIN: CPT | Performed by: NURSE PRACTITIONER

## 2024-07-22 PROCEDURE — 3074F SYST BP LT 130 MM HG: CPT | Performed by: NURSE PRACTITIONER

## 2024-07-22 RX ORDER — AMOXICILLIN 500 MG/1
500 CAPSULE ORAL 2 TIMES DAILY
Qty: 20 CAPSULE | Refills: 0 | Status: SHIPPED | OUTPATIENT
Start: 2024-07-22 | End: 2024-08-01

## 2024-07-22 ASSESSMENT — PATIENT HEALTH QUESTIONNAIRE - PHQ9: CLINICAL INTERPRETATION OF PHQ2 SCORE: 0

## 2024-07-22 ASSESSMENT — FIBROSIS 4 INDEX: FIB4 SCORE: 1.04

## 2024-10-18 ENCOUNTER — IMMUNIZATION (OUTPATIENT)
Dept: OCCUPATIONAL MEDICINE | Facility: CLINIC | Age: 54
End: 2024-10-18

## 2024-10-18 DIAGNOSIS — Z23 NEED FOR VACCINATION: Primary | ICD-10-CM

## 2024-11-11 ENCOUNTER — EH NON-PROVIDER (OUTPATIENT)
Dept: OCCUPATIONAL MEDICINE | Facility: CLINIC | Age: 54
End: 2024-11-11

## 2024-11-11 PROCEDURE — 94375 RESPIRATORY FLOW VOLUME LOOP: CPT | Performed by: PREVENTIVE MEDICINE

## 2024-11-19 ENCOUNTER — HOSPITAL ENCOUNTER (OUTPATIENT)
Dept: RADIOLOGY | Facility: MEDICAL CENTER | Age: 54
End: 2024-11-19
Attending: OBSTETRICS & GYNECOLOGY
Payer: COMMERCIAL

## 2024-11-19 DIAGNOSIS — Z12.31 VISIT FOR SCREENING MAMMOGRAM: ICD-10-CM

## 2024-11-19 PROCEDURE — 77067 SCR MAMMO BI INCL CAD: CPT

## 2025-01-13 ENCOUNTER — OFFICE VISIT (OUTPATIENT)
Dept: MEDICAL GROUP | Facility: LAB | Age: 55
End: 2025-01-13
Payer: COMMERCIAL

## 2025-01-13 VITALS
WEIGHT: 226.41 LBS | HEIGHT: 68 IN | RESPIRATION RATE: 16 BRPM | DIASTOLIC BLOOD PRESSURE: 85 MMHG | SYSTOLIC BLOOD PRESSURE: 122 MMHG | OXYGEN SATURATION: 94 % | TEMPERATURE: 98.5 F | BODY MASS INDEX: 34.31 KG/M2 | HEART RATE: 86 BPM

## 2025-01-13 DIAGNOSIS — M25.531 WRIST PAIN, ACUTE, RIGHT: ICD-10-CM

## 2025-01-13 DIAGNOSIS — E78.5 DYSLIPIDEMIA: ICD-10-CM

## 2025-01-13 DIAGNOSIS — E55.9 VITAMIN D DEFICIENCY: ICD-10-CM

## 2025-01-13 DIAGNOSIS — Z12.11 SCREEN FOR COLON CANCER: ICD-10-CM

## 2025-01-13 DIAGNOSIS — M20.61 ACQUIRED DEFORMITY OF RIGHT TOE: ICD-10-CM

## 2025-01-13 DIAGNOSIS — E66.811 OBESITY (BMI 30.0-34.9): ICD-10-CM

## 2025-01-13 PROCEDURE — 99214 OFFICE O/P EST MOD 30 MIN: CPT | Performed by: INTERNAL MEDICINE

## 2025-01-13 PROCEDURE — 3078F DIAST BP <80 MM HG: CPT | Performed by: INTERNAL MEDICINE

## 2025-01-13 PROCEDURE — 3074F SYST BP LT 130 MM HG: CPT | Performed by: INTERNAL MEDICINE

## 2025-01-13 ASSESSMENT — FIBROSIS 4 INDEX: FIB4 SCORE: 1.06

## 2025-01-13 ASSESSMENT — PATIENT HEALTH QUESTIONNAIRE - PHQ9: CLINICAL INTERPRETATION OF PHQ2 SCORE: 0

## 2025-01-13 NOTE — PROGRESS NOTES
CC: Diana Aguilera is a 54 y.o. female is suffering from   Chief Complaint   Patient presents with    Wrist Pain     Right X few weeks     Toe Injury     R second toe previously  broken     Weight Check         SUBJECTIVE:  1. Screen for colon cancer  Diana is here for follow-up is in need of screening for colon cancer, has not undergone previous colonoscopy    2. Vitamin D deficiency  Recheck vitamin D    3. Dyslipidemia  Recheck lipid profile    4. Obesity (BMI 30.0-34.9)  Consider Ozempic    5. Wrist pain, acute, right  Referral written to Dr. Pak    6. Acquired deformity of right toe  Referral written to .         Past social, family, history: , disabled ,  with cardiac stents x 5  Social History     Tobacco Use    Smoking status: Never    Smokeless tobacco: Never   Substance Use Topics    Alcohol use: Yes    Drug use: No         MEDICATIONS:    Current Outpatient Medications:     Zoster Vac Recomb Adjuvanted (SHINGRIX) 50 MCG/0.5ML Recon Susp, Inject  into the shoulder, thigh, or buttocks., Disp: 0.5 mL, Rfl: 0    Zoster Vac Recomb Adjuvanted (SHINGRIX) 50 MCG/0.5ML Recon Susp, Inject  into the shoulder, thigh, or buttocks., Disp: 0.5 mL, Rfl: 0    NON SPECIFIED, Please vaccinate with Shingles., Disp: 1 Each, Rfl: 1    Hydrocortisone Acetate 1 % Cream, Use 1 application Externally twice a day 14 days, Disp: 15 g, Rfl: 2    levonorgestrel (MIRENA) 20 MCG/DAY IUD, 1 Each by Intrauterine route one time., Disp: , Rfl:     PROBLEMS:  Patient Active Problem List    Diagnosis Date Noted    Dyslipidemia 05/09/2013       REVIEW OF SYSTEMS:  Gen.:  No Nausea, Vomiting, fever, Chills.  Heart: No chest pain.  Lungs:  No shortness of Breath.  Psychological: José unusual Anxiety depression     PHYSICAL EXAM      Constitutional: Alert, cooperative, not in acute distress.  Cardiovascular:  Rate Rhythm is regular without murmurs rubs clicks.     Thorax & Lungs: Clear to  "auscultation, no wheezing, rhonchi, or rales  HENT: Normocephalic, Atraumatic.  Eyes: PERRLA, EOMI, Conjunctiva normal.   Neck: Trachia is midline no swelling of the thyroid.   Lymphatic: No lymphadenopathy noted.   Neurologic: Alert & oriented x 3, cranial nerves II through XII are intact, Normal motor function, Normal sensory function, No focal deficits noted.   Psychiatric: Affect normal, Judgment normal, Mood normal.     VITAL SIGNS:/85   Pulse 86   Temp 36.9 °C (98.5 °F) (Temporal)   Resp 16   Ht 1.727 m (5' 8\")   Wt 103 kg (226 lb 6.6 oz)   SpO2 94%   BMI 34.43 kg/m²     Labs: Reviewed    Assessment:                                                     Plan:     1. Screen for colon cancer  Screening for colon cancer written for  - Referral to GI for Colonoscopy  - CBC WITH DIFFERENTIAL; Future    2. Vitamin D deficiency  Recheck vitamin D  - CBC WITH DIFFERENTIAL; Future  - VITAMIN D,25 HYDROXY (DEFICIENCY); Future    3. Dyslipidemia  Recheck lipid profile  - CBC WITH DIFFERENTIAL; Future  - Lipid Profile; Future    4. Obesity (BMI 30.0-34.9)  No change in medical therapy consider Ozempic  - CBC WITH DIFFERENTIAL; Future  - Comp Metabolic Panel; Future    5. Wrist pain, acute, right  X-ray then follow-up with Dr. Pak  - DX-WRIST-COMPLETE 3+ RIGHT; Future  - Referral to Orthopedics    6. Acquired deformity of right toe  X-ray then follow-up with Dr. Singleton.   - Referral to Podiatry  - DX-TOE(S) 2+ RIGHT; Future        "

## 2025-01-17 ENCOUNTER — APPOINTMENT (OUTPATIENT)
Dept: RADIOLOGY | Facility: MEDICAL CENTER | Age: 55
End: 2025-01-17
Attending: INTERNAL MEDICINE
Payer: COMMERCIAL

## 2025-01-17 DIAGNOSIS — M25.531 WRIST PAIN, ACUTE, RIGHT: ICD-10-CM

## 2025-01-17 DIAGNOSIS — M20.61 ACQUIRED DEFORMITY OF RIGHT TOE: ICD-10-CM

## 2025-01-17 PROCEDURE — 73660 X-RAY EXAM OF TOE(S): CPT | Mod: RT

## 2025-01-17 PROCEDURE — 73110 X-RAY EXAM OF WRIST: CPT | Mod: RT

## 2025-03-24 ENCOUNTER — HOSPITAL ENCOUNTER (OUTPATIENT)
Dept: LAB | Facility: MEDICAL CENTER | Age: 55
End: 2025-03-24
Attending: INTERNAL MEDICINE
Payer: COMMERCIAL

## 2025-03-24 DIAGNOSIS — Z12.11 SCREEN FOR COLON CANCER: ICD-10-CM

## 2025-03-24 DIAGNOSIS — E78.5 DYSLIPIDEMIA: ICD-10-CM

## 2025-03-24 DIAGNOSIS — E55.9 VITAMIN D DEFICIENCY: ICD-10-CM

## 2025-03-24 DIAGNOSIS — E66.811 OBESITY (BMI 30.0-34.9): ICD-10-CM

## 2025-03-24 LAB
25(OH)D3 SERPL-MCNC: 30 NG/ML (ref 30–100)
ALBUMIN SERPL BCP-MCNC: 4.1 G/DL (ref 3.2–4.9)
ALBUMIN/GLOB SERPL: 1.5 G/DL
ALP SERPL-CCNC: 76 U/L (ref 30–99)
ALT SERPL-CCNC: 24 U/L (ref 2–50)
ANION GAP SERPL CALC-SCNC: 12 MMOL/L (ref 7–16)
AST SERPL-CCNC: 26 U/L (ref 12–45)
BASOPHILS # BLD AUTO: 0.7 % (ref 0–1.8)
BASOPHILS # BLD: 0.05 K/UL (ref 0–0.12)
BILIRUB SERPL-MCNC: 0.7 MG/DL (ref 0.1–1.5)
BUN SERPL-MCNC: 11 MG/DL (ref 8–22)
CALCIUM ALBUM COR SERPL-MCNC: 8.9 MG/DL (ref 8.5–10.5)
CALCIUM SERPL-MCNC: 9 MG/DL (ref 8.5–10.5)
CHLORIDE SERPL-SCNC: 104 MMOL/L (ref 96–112)
CHOLEST SERPL-MCNC: 173 MG/DL (ref 100–199)
CO2 SERPL-SCNC: 22 MMOL/L (ref 20–33)
CREAT SERPL-MCNC: 0.91 MG/DL (ref 0.5–1.4)
EOSINOPHIL # BLD AUTO: 0.15 K/UL (ref 0–0.51)
EOSINOPHIL NFR BLD: 2.1 % (ref 0–6.9)
ERYTHROCYTE [DISTWIDTH] IN BLOOD BY AUTOMATED COUNT: 40.7 FL (ref 35.9–50)
FASTING STATUS PATIENT QL REPORTED: NORMAL
GFR SERPLBLD CREATININE-BSD FMLA CKD-EPI: 75 ML/MIN/1.73 M 2
GLOBULIN SER CALC-MCNC: 2.8 G/DL (ref 1.9–3.5)
GLUCOSE SERPL-MCNC: 102 MG/DL (ref 65–99)
HCT VFR BLD AUTO: 43.7 % (ref 37–47)
HDLC SERPL-MCNC: 35 MG/DL
HGB BLD-MCNC: 15.2 G/DL (ref 12–16)
IMM GRANULOCYTES # BLD AUTO: 0.04 K/UL (ref 0–0.11)
IMM GRANULOCYTES NFR BLD AUTO: 0.6 % (ref 0–0.9)
LDLC SERPL CALC-MCNC: 120 MG/DL
LYMPHOCYTES # BLD AUTO: 2 K/UL (ref 1–4.8)
LYMPHOCYTES NFR BLD: 28.4 % (ref 22–41)
MCH RBC QN AUTO: 30.5 PG (ref 27–33)
MCHC RBC AUTO-ENTMCNC: 34.8 G/DL (ref 32.2–35.5)
MCV RBC AUTO: 87.6 FL (ref 81.4–97.8)
MONOCYTES # BLD AUTO: 0.64 K/UL (ref 0–0.85)
MONOCYTES NFR BLD AUTO: 9.1 % (ref 0–13.4)
NEUTROPHILS # BLD AUTO: 4.17 K/UL (ref 1.82–7.42)
NEUTROPHILS NFR BLD: 59.1 % (ref 44–72)
NRBC # BLD AUTO: 0 K/UL
NRBC BLD-RTO: 0 /100 WBC (ref 0–0.2)
PLATELET # BLD AUTO: 197 K/UL (ref 164–446)
PMV BLD AUTO: 10.5 FL (ref 9–12.9)
POTASSIUM SERPL-SCNC: 4.2 MMOL/L (ref 3.6–5.5)
PROT SERPL-MCNC: 6.9 G/DL (ref 6–8.2)
RBC # BLD AUTO: 4.99 M/UL (ref 4.2–5.4)
SODIUM SERPL-SCNC: 138 MMOL/L (ref 135–145)
TRIGL SERPL-MCNC: 89 MG/DL (ref 0–149)
WBC # BLD AUTO: 7.1 K/UL (ref 4.8–10.8)

## 2025-03-24 PROCEDURE — 80053 COMPREHEN METABOLIC PANEL: CPT

## 2025-03-24 PROCEDURE — 80061 LIPID PANEL: CPT

## 2025-03-24 PROCEDURE — 85025 COMPLETE CBC W/AUTO DIFF WBC: CPT

## 2025-03-24 PROCEDURE — 36415 COLL VENOUS BLD VENIPUNCTURE: CPT

## 2025-03-24 PROCEDURE — 82306 VITAMIN D 25 HYDROXY: CPT

## 2025-05-27 ENCOUNTER — OFFICE VISIT (OUTPATIENT)
Dept: MEDICAL GROUP | Facility: LAB | Age: 55
End: 2025-05-27
Payer: COMMERCIAL

## 2025-05-27 VITALS
WEIGHT: 225.53 LBS | DIASTOLIC BLOOD PRESSURE: 70 MMHG | TEMPERATURE: 97.2 F | SYSTOLIC BLOOD PRESSURE: 118 MMHG | HEIGHT: 68 IN | OXYGEN SATURATION: 95 % | BODY MASS INDEX: 34.18 KG/M2 | RESPIRATION RATE: 16 BRPM | HEART RATE: 66 BPM

## 2025-05-27 DIAGNOSIS — G89.29 CHRONIC PAIN OF RIGHT WRIST: Primary | ICD-10-CM

## 2025-05-27 DIAGNOSIS — M25.531 CHRONIC PAIN OF RIGHT WRIST: Primary | ICD-10-CM

## 2025-05-27 DIAGNOSIS — M25.50 ARTHRALGIA, UNSPECIFIED JOINT: ICD-10-CM

## 2025-05-27 DIAGNOSIS — L98.9 SKIN LESION: ICD-10-CM

## 2025-05-27 PROCEDURE — 99214 OFFICE O/P EST MOD 30 MIN: CPT | Performed by: PHYSICIAN ASSISTANT

## 2025-05-27 PROCEDURE — 3074F SYST BP LT 130 MM HG: CPT | Performed by: PHYSICIAN ASSISTANT

## 2025-05-27 PROCEDURE — 3078F DIAST BP <80 MM HG: CPT | Performed by: PHYSICIAN ASSISTANT

## 2025-05-27 ASSESSMENT — FIBROSIS 4 INDEX: FIB4 SCORE: 1.43

## 2025-05-27 NOTE — PROGRESS NOTES
Subjective:     CC: chronic wrist pain    HPI:   Diana here today with   Verbal consent was acquired by the patient to use [employer] ambient listening note generation during this visit Yes     History of Present Illness  The patient presents with right wrist pain and a skin lesion.    Right Wrist Pain  -previously evaluated clinic with PCP 01/13/2025 for right wrist pain.  Patient was referred to orthopedics as well as had x-ray of the right wrist completed  - Referred to [facility] in 02/2025 for a potential injection but advised to obtain an MRI due to a suspected tear.  - No follow-up scheduled with orthopedics.  - Pain  exacerbated by lifting and flexion/extension of the wrist  - Occasionally uses a brace during sleep but finds it restrictive.  - No history of metal implants or wrist surgeries.  - Bracing and OTC anti-inflammatories provided temporary relief.  - pt states she had discussed occupational therapy but was advised to complete MRI first    Body Aches  - Attributed to poor sleep quality.  - Last lab results were normal.  - Experiences fatigue severe enough to limit exercise and activities, possibly related to premenopausal changes.    Skin Lesion  - Requests a dermatology referral for a skin lesion that is changing in size and occasionally itches.  - No prior dermatology visits.    Supplemental information: None provided.       HISTORY/REASON FOR EXAM:  Pain/Deformity Following Trauma.        TECHNIQUE/EXAM DESCRIPTION AND NUMBER OF VIEWS:  3 views of the RIGHT wrist.     COMPARISON: None     FINDINGS:  There is no definite fracture, dislocation, significant joint space loss, or erosion.     IMPRESSION:     No radiographic evidence of acute traumatic injury.    ROS:  All ROS negative except for pertinent positives listed above.       Current Medications and Prescriptions Ordered in Epic[1]      Objective:     Exam:  /70 (BP Location: Left arm, Patient Position: Sitting, BP Cuff Size: Adult)    "Pulse 66   Temp 36.2 °C (97.2 °F) (Temporal)   Resp 16   Ht 1.727 m (5' 8\")   Wt 102 kg (225 lb 8.5 oz)   SpO2 95%   BMI 34.29 kg/m²  Body mass index is 34.29 kg/m².    Gen: Alert and oriented, No apparent distress.  Neck: Neck is supple without lymphadenopathy.  Lungs: Normal effort, CTA bilaterally, no wheezes, rhonchi, or rales  CV: Regular rate and rhythm. No murmurs, rubs, or gallops.  Ext: No clubbing, cyanosis, edema.  Physical Exam  Musculoskeletal:      Right wrist: Bony tenderness present. No swelling, deformity, effusion, lacerations, tenderness, snuff box tenderness or crepitus. Normal range of motion. Abnormal pulse.      Left wrist: Normal.      Comments: Tender to palpation just medial of ulnar styloid process.  No discoloration, bruising or swelling noted.  Clicking/popping noise noted with ulnar/radial movements of the hand.  Pain with flexion/extension of the hand           Assessment & Plan:     54 y.o. female with the following -     1. Chronic pain of right wrist (Primary)  Chronic pain of the right wrist which has not responded to more conservative treatment including NSAIDs, bracing, rest.  Previous evaluation with Ortho and occupational therapy did suggest MRI of the wrist for further evaluation, suspected triangular fibrocartilage tear  Diagnostic plan: MRI of the right wrist ordered for further evaluation.  Treatment plan: Treatment options, including injection or surgery, to be considered based on MRI findings.  - MR-WRIST W/O RIGHT; Future    2. Skin lesion  Lesion changing in size and occasionally itching.  Diagnostic plan: Dermatology referral initiated for evaluation.  Treatment plan: Patient to receive referral details via [employer].  - Referral to Dermatology    3.Joint pain  Generalized joint pain possibly linked to poor sleep or underlying conditions such as autoimmune disorders or arthritis.  Diagnostic plan: Previous labs normal.  Treatment plan: Advised to improve sleep " quality and maintain regular exercise.  Clinical decision making: Further diagnostics, including lab work, if pain persists despite improved sleep.      I spent a total of 25 minutes with record review, exam, communication with the patient, communication with other providers, and documentation of this encounter.      No follow-ups on file.    Please note that this dictation was created using voice recognition software. I have made every reasonable attempt to correct obvious errors, but there may be errors of grammar and possibly content that I did not discover before finalizing the note.             [1]   Current Outpatient Medications Ordered in Epic   Medication Sig Dispense Refill    Hydrocortisone Acetate 1 % Cream Use 1 application Externally twice a day 14 days 15 g 2    levonorgestrel (MIRENA) 20 MCG/DAY IUD 1 Each by Intrauterine route one time.      Zoster Vac Recomb Adjuvanted (SHINGRIX) 50 MCG/0.5ML Recon Susp Inject  into the shoulder, thigh, or buttocks. 0.5 mL 0    Zoster Vac Recomb Adjuvanted (SHINGRIX) 50 MCG/0.5ML Recon Susp Inject  into the shoulder, thigh, or buttocks. 0.5 mL 0    NON SPECIFIED Please vaccinate with Shingles. 1 Each 1     No current [facility]-ordered facility-administered medications on file.

## 2025-05-27 NOTE — Clinical Note
REFERRAL APPROVAL NOTICE         Sent on May 27, 2025                   Diana Jeffrey Genesis  [address] [place] [address]                   Dear Ms. Genesis,    After a careful review of the medical information and benefit coverage, [facility] has processed your referral. See below for additional details.    If applicable, you must be actively enrolled with your insurance for coverage of the authorized service. If you have any questions regarding your coverage, please contact your insurance directly.    REFERRAL INFORMATION   Referral #:  [id number]  Referred-To Department    Referred-By Provider:  Dermatology    Candida Carmona P.A.-C.   [facility]      [address]  [place] [address]  [phone] [address]  [place] [id number] [phone]    Referral Start Date:  05/27/2025  Referral End Date:   05/27/2026             SCHEDULING  If you do not already have an appointment, please call [phone] to make an appointment.     MORE INFORMATION  If you do not already have a [facility] account, sign up at: [email or web address]  You can access your medical information, make appointments, see lab results, billing information, and more.  If you have questions regarding this referral, please contact  the [facility] Referrals department at:             [phone]. Monday - Friday 8:00AM - 5:00PM.     Sincerely,    [facility]

## 2025-06-13 ENCOUNTER — OFFICE VISIT (OUTPATIENT)
Dept: DERMATOLOGY | Facility: IMAGING CENTER | Age: 55
End: 2025-06-13
Payer: COMMERCIAL

## 2025-06-13 DIAGNOSIS — D18.01 CHERRY ANGIOMA: ICD-10-CM

## 2025-06-13 DIAGNOSIS — D49.2 NEOPLASM OF SKIN: Primary | ICD-10-CM

## 2025-06-13 PROCEDURE — 99203 OFFICE O/P NEW LOW 30 MIN: CPT | Mod: 25 | Performed by: DERMATOLOGY

## 2025-06-13 PROCEDURE — 11102 TANGNTL BX SKIN SINGLE LES: CPT | Performed by: DERMATOLOGY

## 2025-06-13 NOTE — PROGRESS NOTES
CC:     Subjective: New patient here for mole of concern     HPI/location: rt elbow  Time present: 1 month   Painful lesion: No  Itching lesion: Yes  Enlarging lesion: No  Anything make it better or worse?     ROS: no fevers/chills. No itch.  No cough  Relevant PMH: NC  Social: NS    PE: Gen:WDWN female in NAD. Skin: focal exam: face - cherry red papule right medial eyebrow. Right arm - crusted papule nondescript. Approx 0.9cm    A/P: Neoplasm NOS: ISK vs NMSC, r arm  -consent for bx, including R/B/A. Cleaned with EtOH, anesthesia with lidocaine 1% + epinephrine, shave bx, AlCl3 for hemostasis  -vaseline/bandage and wound care reviewed    Cherry angioma: face:  -b/r    Reviewed ABCDEs and handout supplied  Advised re: sunprotection and skin cancer detection  F/u PRN    I have reviewed medications relevant to my specialty.

## 2025-07-14 ENCOUNTER — HOSPITAL ENCOUNTER (OUTPATIENT)
Dept: RADIOLOGY | Facility: MEDICAL CENTER | Age: 55
End: 2025-07-14
Attending: PHYSICIAN ASSISTANT
Payer: COMMERCIAL

## 2025-07-14 DIAGNOSIS — M25.531 CHRONIC PAIN OF RIGHT WRIST: ICD-10-CM

## 2025-07-14 DIAGNOSIS — G89.29 CHRONIC PAIN OF RIGHT WRIST: ICD-10-CM

## 2025-07-14 PROCEDURE — 73221 MRI JOINT UPR EXTREM W/O DYE: CPT | Mod: RT
